# Patient Record
Sex: MALE | Race: WHITE | NOT HISPANIC OR LATINO
[De-identification: names, ages, dates, MRNs, and addresses within clinical notes are randomized per-mention and may not be internally consistent; named-entity substitution may affect disease eponyms.]

---

## 2020-11-25 PROBLEM — Z00.00 ENCOUNTER FOR PREVENTIVE HEALTH EXAMINATION: Status: ACTIVE | Noted: 2020-11-25

## 2020-12-22 ENCOUNTER — APPOINTMENT (OUTPATIENT)
Dept: CARDIOLOGY | Facility: CLINIC | Age: 60
End: 2020-12-22
Payer: COMMERCIAL

## 2020-12-22 ENCOUNTER — NON-APPOINTMENT (OUTPATIENT)
Age: 60
End: 2020-12-22

## 2020-12-22 VITALS — SYSTOLIC BLOOD PRESSURE: 160 MMHG | DIASTOLIC BLOOD PRESSURE: 90 MMHG

## 2020-12-22 VITALS — WEIGHT: 145 LBS | BODY MASS INDEX: 24.75 KG/M2 | HEIGHT: 64 IN | OXYGEN SATURATION: 98 % | HEART RATE: 75 BPM

## 2020-12-22 VITALS — SYSTOLIC BLOOD PRESSURE: 150 MMHG | DIASTOLIC BLOOD PRESSURE: 90 MMHG

## 2020-12-22 DIAGNOSIS — Z82.0 FAMILY HISTORY OF EPILEPSY AND OTHER DISEASES OF THE NERVOUS SYSTEM: ICD-10-CM

## 2020-12-22 DIAGNOSIS — Z80.0 FAMILY HISTORY OF MALIGNANT NEOPLASM OF DIGESTIVE ORGANS: ICD-10-CM

## 2020-12-22 DIAGNOSIS — Z82.49 FAMILY HISTORY OF ISCHEMIC HEART DISEASE AND OTHER DISEASES OF THE CIRCULATORY SYSTEM: ICD-10-CM

## 2020-12-22 DIAGNOSIS — Z82.5 FAMILY HISTORY OF ASTHMA AND OTHER CHRONIC LOWER RESPIRATORY DISEASES: ICD-10-CM

## 2020-12-22 DIAGNOSIS — Z78.9 OTHER SPECIFIED HEALTH STATUS: ICD-10-CM

## 2020-12-22 PROCEDURE — 93000 ELECTROCARDIOGRAM COMPLETE: CPT

## 2020-12-22 PROCEDURE — 99072 ADDL SUPL MATRL&STAF TM PHE: CPT

## 2020-12-22 PROCEDURE — 99204 OFFICE O/P NEW MOD 45 MIN: CPT

## 2020-12-22 RX ORDER — ALPRAZOLAM 0.5 MG/1
0.5 TABLET ORAL DAILY
Refills: 0 | Status: ACTIVE | COMMUNITY

## 2020-12-22 RX ORDER — PAROXETINE 25 MG/1
25 TABLET, FILM COATED, EXTENDED RELEASE ORAL
Qty: 90 | Refills: 0 | Status: ACTIVE | COMMUNITY
Start: 2020-11-17

## 2020-12-22 NOTE — PHYSICAL EXAM
[General Appearance - Well Developed] : well developed [Normal Appearance] : normal appearance [Well Groomed] : well groomed [General Appearance - Well Nourished] : well nourished [No Deformities] : no deformities [General Appearance - In No Acute Distress] : no acute distress [Normal Conjunctiva] : the conjunctiva exhibited no abnormalities [Eyelids - No Xanthelasma] : the eyelids demonstrated no xanthelasmas [Normal Oral Mucosa] : normal oral mucosa [No Oral Pallor] : no oral pallor [No Oral Cyanosis] : no oral cyanosis [Normal Jugular Venous A Waves Present] : normal jugular venous A waves present [Normal Jugular Venous V Waves Present] : normal jugular venous V waves present [No Jugular Venous Key A Waves] : no jugular venous key A waves [Heart Rate And Rhythm] : heart rate and rhythm were normal [Heart Sounds] : normal S1 and S2 [Murmurs] : no murmurs present [Respiration, Rhythm And Depth] : normal respiratory rhythm and effort [Exaggerated Use Of Accessory Muscles For Inspiration] : no accessory muscle use [Auscultation Breath Sounds / Voice Sounds] : lungs were clear to auscultation bilaterally [Abdomen Soft] : soft [Abdomen Tenderness] : non-tender [Abdomen Mass (___ Cm)] : no abdominal mass palpated [Abnormal Walk] : normal gait [Gait - Sufficient For Exercise Testing] : the gait was sufficient for exercise testing [Nail Clubbing] : no clubbing of the fingernails [Cyanosis, Localized] : no localized cyanosis [Petechial Hemorrhages (___cm)] : no petechial hemorrhages [Skin Color & Pigmentation] : normal skin color and pigmentation [] : no rash [No Venous Stasis] : no venous stasis [Skin Lesions] : no skin lesions [No Skin Ulcers] : no skin ulcer [No Xanthoma] : no  xanthoma was observed [Oriented To Time, Place, And Person] : oriented to person, place, and time [Affect] : the affect was normal [Mood] : the mood was normal [No Anxiety] : not feeling anxious

## 2020-12-26 NOTE — ASSESSMENT
[FreeTextEntry1] : His blood pressure is high during this visit.  Apparently, in the past his usual blood pressure used to be in the 120-130 range.  He needs to follow low-salt diet.\par \par I did not hear any murmur suggestive of mitral regurgitation during this visit even with Valsalva maneuver.\par \par Blood test in November showed normal liver profile.  Total cholesterol was 152 with triglycerides of 103, HDL 43 and LDL 88.  TSH was 1.87 .  Blood test done in August showed blood sugar was 86, BUN 14.8 and creatinine 1.2.  Estimated GFR was 65.  Potassium was 4.3.  Total cholesterol was 160 with triglycerides 137, HDL 40 and LDL 93.\par \par Echocardiogram done in March 2020 showed normal left ventricular size, wall thickness and systolic function with ejection fraction of 55 to 60%.  Grade 1 diastolic dysfunction was present.  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  There was trace mitral regurgitation and tricuspid regurgitation.

## 2020-12-26 NOTE — REASON FOR VISIT
[Initial Evaluation] : an initial evaluation of [Hypertension] : hypertension [Palpitations] : palpitations [FreeTextEntry1] : mitral regurgitation.

## 2020-12-26 NOTE — HISTORY OF PRESENT ILLNESS
[FreeTextEntry1] : 60-year-old gentleman came for cardiac evaluation.  He is regularly followed by Dr. Guru Armas.\par \par About 20 years ago, he had palpitation whemn he felt fluttering of the heart.  His primary care doctor had referred him to Dr. Soriano, the cardiologist in [place].  He had echocardiogram and stress test at that time and  was told to have mild mitral regurgitation.  He was reassured about his condition and no prescription or treatment was prescribed at that time.  He was told to stay away from caffeine.\par \par Since then, he has been getting intermittent palpitation, which occurs on and off.  It feels like fluttering lasting couple of minutes.  There are no associated symptoms except sometimes cough.  The pattern of palpitation has remained unchanged.  He may get it on and off on a daily basis for up to a month, then it goes away, and then comes back again several months later.  It is not worsened by physical exertion.  He could not identify any specific triggers for the palpitation.  It subsides spontaneously.\par \par He denies any exertional chest pain, palp rotation, shortness of breath or dizziness. \par \par  He was recently diagnosed to have hypertension and was put on olmesartan 20 mg daily.  He has been taking atorvastatin 10 mg at bedtime for many years.  He has also been on Synthroid for at least 20 years.  He had Hashimoto's thyroiditis followed by hypothyroidism.\par \par On the way to the office he had palpitation.  He does not have any now.  He tries to follow a low-salt diet.  However he does eat processed foods.

## 2020-12-26 NOTE — DISCUSSION/SUMMARY
[FreeTextEntry1] : I reviewed the echo findings with him and the blood test results.  For evaluation of palpitation, I recommended telemetry monitor.  I elected not to change any of his blood pressure medications at this time, but gave him a low-salt diet sheet.  I will follow-up in a month.  If his blood pressure remains elevated during the next visit then I will increase his medications.\par \par Because of the interatrial septal aneurysm, I told him to take aspirin 81 mg once a day.\par \par For further evaluation of his intermittent palpitation I recommended telemetry monitor.

## 2021-01-28 ENCOUNTER — APPOINTMENT (OUTPATIENT)
Dept: CARDIOLOGY | Facility: CLINIC | Age: 61
End: 2021-01-28
Payer: COMMERCIAL

## 2021-01-28 VITALS — BODY MASS INDEX: 24.37 KG/M2 | WEIGHT: 142 LBS | OXYGEN SATURATION: 91 % | HEART RATE: 80 BPM

## 2021-01-28 VITALS — SYSTOLIC BLOOD PRESSURE: 136 MMHG | DIASTOLIC BLOOD PRESSURE: 84 MMHG

## 2021-01-28 DIAGNOSIS — I34.0 NONRHEUMATIC MITRAL (VALVE) INSUFFICIENCY: ICD-10-CM

## 2021-01-28 DIAGNOSIS — Z86.39 PERSONAL HISTORY OF OTHER ENDOCRINE, NUTRITIONAL AND METABOLIC DISEASE: ICD-10-CM

## 2021-01-28 PROCEDURE — 99214 OFFICE O/P EST MOD 30 MIN: CPT

## 2021-01-28 PROCEDURE — 99072 ADDL SUPL MATRL&STAF TM PHE: CPT

## 2021-01-28 NOTE — REASON FOR VISIT
[Follow-Up - Clinic] : a clinic follow-up of [Hypertension] : hypertension [Palpitations] : palpitations [FreeTextEntry1] : DENNISC, APC.

## 2021-01-28 NOTE — HISTORY OF PRESENT ILLNESS
[FreeTextEntry1] : He denies any chest pain shortness of breath or dizziness.  He still gets some palpitations and skipped beats on and off.  A Telemetry monitor tracings showed occasional APCs including couplets and occasional VPC including short episodes of bigeminy.

## 2021-01-28 NOTE — ASSESSMENT
[FreeTextEntry1] : His blood pressure has gone down significantly with the treatment that was started during his previous visit.  It needs to go down even further.  He also has palpitation with APCs and VPCs.

## 2021-01-28 NOTE — PHYSICAL EXAM
[General Appearance - Well Developed] : well developed [Normal Appearance] : normal appearance [Well Groomed] : well groomed [General Appearance - Well Nourished] : well nourished [No Deformities] : no deformities [General Appearance - In No Acute Distress] : no acute distress [Normal Conjunctiva] : the conjunctiva exhibited no abnormalities [Eyelids - No Xanthelasma] : the eyelids demonstrated no xanthelasmas [Normal Oral Mucosa] : normal oral mucosa [No Oral Pallor] : no oral pallor [No Oral Cyanosis] : no oral cyanosis [Normal Jugular Venous A Waves Present] : normal jugular venous A waves present [Normal Jugular Venous V Waves Present] : normal jugular venous V waves present [No Jugular Venous Key A Waves] : no jugular venous key A waves [Respiration, Rhythm And Depth] : normal respiratory rhythm and effort [Exaggerated Use Of Accessory Muscles For Inspiration] : no accessory muscle use [Auscultation Breath Sounds / Voice Sounds] : lungs were clear to auscultation bilaterally [Heart Rate And Rhythm] : heart rate and rhythm were normal [Heart Sounds] : normal S1 and S2 [Murmurs] : no murmurs present [Abdomen Soft] : soft [Abdomen Tenderness] : non-tender [Abdomen Mass (___ Cm)] : no abdominal mass palpated [Abnormal Walk] : normal gait [Gait - Sufficient For Exercise Testing] : the gait was sufficient for exercise testing [Nail Clubbing] : no clubbing of the fingernails [Cyanosis, Localized] : no localized cyanosis [Petechial Hemorrhages (___cm)] : no petechial hemorrhages [Skin Color & Pigmentation] : normal skin color and pigmentation [] : no rash [No Venous Stasis] : no venous stasis [Skin Lesions] : no skin lesions [No Skin Ulcers] : no skin ulcer [No Xanthoma] : no  xanthoma was observed [Oriented To Time, Place, And Person] : oriented to person, place, and time [Affect] : the affect was normal [Mood] : the mood was normal [No Anxiety] : not feeling anxious

## 2021-01-28 NOTE — DISCUSSION/SUMMARY
[FreeTextEntry1] : To get blood pressure under better control and also for the palpitation in the atrioventricular arrhythmia I added metoprolol succinate 25 mg daily and hydrochlorothiazide 12.5 mg daily.  Basic metabolic panel to be done 2 weeks later.  He is on the results further recommendations will follow.

## 2021-02-13 ENCOUNTER — TRANSCRIPTION ENCOUNTER (OUTPATIENT)
Age: 61
End: 2021-02-13

## 2021-02-25 ENCOUNTER — APPOINTMENT (OUTPATIENT)
Dept: CARDIOLOGY | Facility: CLINIC | Age: 61
End: 2021-02-25
Payer: COMMERCIAL

## 2021-02-25 VITALS — BODY MASS INDEX: 24.55 KG/M2 | OXYGEN SATURATION: 96 % | HEART RATE: 54 BPM | WEIGHT: 143 LBS

## 2021-02-25 VITALS — DIASTOLIC BLOOD PRESSURE: 80 MMHG | SYSTOLIC BLOOD PRESSURE: 134 MMHG

## 2021-02-25 LAB
ANION GAP SERPL CALC-SCNC: 11 MMOL/L
BUN SERPL-MCNC: 17 MG/DL
CALCIUM SERPL-MCNC: 9.5 MG/DL
CHLORIDE SERPL-SCNC: 102 MMOL/L
CO2 SERPL-SCNC: 29 MMOL/L
CREAT SERPL-MCNC: 1.18 MG/DL
GLUCOSE SERPL-MCNC: 81 MG/DL
MAGNESIUM SERPL-MCNC: 2.1 MG/DL
POTASSIUM SERPL-SCNC: 4.3 MMOL/L
SODIUM SERPL-SCNC: 142 MMOL/L

## 2021-02-25 PROCEDURE — 99072 ADDL SUPL MATRL&STAF TM PHE: CPT

## 2021-02-25 PROCEDURE — 99214 OFFICE O/P EST MOD 30 MIN: CPT

## 2021-02-25 NOTE — DISCUSSION/SUMMARY
[FreeTextEntry1] : At this time I elected not to change any of his medications at size low salt diet.  If his blood pressure continues to stay in this range during his next visit, I did increase hydrochlorothiazide or add low dose amlodipine depending upon how he is doing.

## 2021-02-25 NOTE — HISTORY OF PRESENT ILLNESS
[FreeTextEntry1] : He states that with the addition of metoprolol 25 mg daily the palpitation has completely resolved.  Hydrochlorothiazide 12.5 mg once a day was also added during his previous visit for better control of blood pressure.Presently he has no symptoms.\par \par Basic metabolic panel done on the 21st was normal.

## 2021-02-25 NOTE — ASSESSMENT
[FreeTextEntry1] : His palpitation is controlled on low-dose metoprolol.  Blood pressure is basically the same in spite of addition of low-dose hydrochlorothiazide.

## 2021-04-15 ENCOUNTER — NON-APPOINTMENT (OUTPATIENT)
Age: 61
End: 2021-04-15

## 2021-04-15 ENCOUNTER — APPOINTMENT (OUTPATIENT)
Dept: CARDIOLOGY | Facility: CLINIC | Age: 61
End: 2021-04-15
Payer: COMMERCIAL

## 2021-04-15 VITALS — BODY MASS INDEX: 24.37 KG/M2 | WEIGHT: 142 LBS | OXYGEN SATURATION: 94 % | HEART RATE: 64 BPM

## 2021-04-15 PROCEDURE — 93000 ELECTROCARDIOGRAM COMPLETE: CPT

## 2021-04-15 PROCEDURE — 99213 OFFICE O/P EST LOW 20 MIN: CPT

## 2021-04-15 PROCEDURE — 99072 ADDL SUPL MATRL&STAF TM PHE: CPT

## 2021-04-15 NOTE — HISTORY OF PRESENT ILLNESS
[FreeTextEntry1] : In general he feels okay.  He takes metoprolol with dinner around 6 -7 PM. An hour or so after that he feels mild palpitation lasting couple of minutes.  Rarely feels it during the day.  Associated symptoms.

## 2021-04-15 NOTE — ASSESSMENT
[FreeTextEntry1] : Overall he is doing well.  He still gets occasional palpitation.  I wonder if it is due to trough level of the medication.

## 2021-04-15 NOTE — DISCUSSION/SUMMARY
[FreeTextEntry1] : I did not make any change in his medications but on a trial basis I told him to take metoprolol in the morning and see how he does.  He was taking Lipitor in the morning I told him to take it at bedtime.  He is adequatrely beta-blocked on 25 mg dose.

## 2021-06-16 ENCOUNTER — RX RENEWAL (OUTPATIENT)
Age: 61
End: 2021-06-16

## 2021-07-22 ENCOUNTER — RX RENEWAL (OUTPATIENT)
Age: 61
End: 2021-07-22

## 2021-08-12 ENCOUNTER — APPOINTMENT (OUTPATIENT)
Dept: CARDIOLOGY | Facility: CLINIC | Age: 61
End: 2021-08-12
Payer: COMMERCIAL

## 2021-08-12 ENCOUNTER — NON-APPOINTMENT (OUTPATIENT)
Age: 61
End: 2021-08-12

## 2021-08-12 VITALS — SYSTOLIC BLOOD PRESSURE: 114 MMHG | DIASTOLIC BLOOD PRESSURE: 70 MMHG

## 2021-08-12 VITALS — OXYGEN SATURATION: 97 % | BODY MASS INDEX: 24.03 KG/M2 | HEART RATE: 57 BPM | WEIGHT: 140 LBS

## 2021-08-12 VITALS — DIASTOLIC BLOOD PRESSURE: 70 MMHG | SYSTOLIC BLOOD PRESSURE: 114 MMHG

## 2021-08-12 PROCEDURE — 99214 OFFICE O/P EST MOD 30 MIN: CPT

## 2021-08-12 PROCEDURE — 93000 ELECTROCARDIOGRAM COMPLETE: CPT

## 2021-08-12 NOTE — HISTORY OF PRESENT ILLNESS
[FreeTextEntry1] : To get intermittent palpitation. Like skipped beat. It lasts a few seconds. There are no associated symptoms. He also gets postural dizziness. He is tired during the day and can go to sleep anytime. However, he does not have any history consistent with sleep apnea.

## 2021-08-12 NOTE — ASSESSMENT
[FreeTextEntry1] : Has postural dizziness. Blood pressure is very well controlled.\par \par Medications can be decreased to see if he would feel better.

## 2021-08-12 NOTE — REASON FOR VISIT
[Symptom and Test Evaluation] : symptom and test evaluation [Arrhythmia/ECG Abnorrmalities] : arrhythmia/ECG abnormalities [Hypertension] : hypertension

## 2021-08-12 NOTE — DISCUSSION/SUMMARY
[FreeTextEntry1] : In a trial basis I told him to hold hydrochlorothiazide and see what happens with his postural dizziness. He has no exertional related symptoms. He rides his bike without any symptoms including palpitation.

## 2021-09-16 ENCOUNTER — APPOINTMENT (OUTPATIENT)
Dept: CARDIOLOGY | Facility: CLINIC | Age: 61
End: 2021-09-16
Payer: COMMERCIAL

## 2021-09-16 VITALS — OXYGEN SATURATION: 98 % | BODY MASS INDEX: 24.2 KG/M2 | HEART RATE: 49 BPM | WEIGHT: 141 LBS

## 2021-09-16 VITALS — DIASTOLIC BLOOD PRESSURE: 80 MMHG | SYSTOLIC BLOOD PRESSURE: 144 MMHG

## 2021-09-16 PROCEDURE — 99214 OFFICE O/P EST MOD 30 MIN: CPT

## 2021-09-16 RX ORDER — OLMESARTAN MEDOXOMIL 20 MG/1
20 TABLET, FILM COATED ORAL
Qty: 90 | Refills: 1 | Status: ACTIVE | COMMUNITY
Start: 2020-05-21 | End: 1900-01-01

## 2021-09-16 NOTE — HISTORY OF PRESENT ILLNESS
[FreeTextEntry1] : He feels better without the hydrochlorothiazide.  Does not have postural dizziness.  Palpitation is like fluttering and very infrequent and does not bother him.

## 2021-09-16 NOTE — DISCUSSION/SUMMARY
[FreeTextEntry1] : I told him to stay on the diuretic.  However for better control of blood pressure I increased olmesartan to 40 mg daily.  Based upon his response further adjustments will follow.

## 2021-09-16 NOTE — ASSESSMENT
[FreeTextEntry1] : Although the postural dizziness is gone without hydrochlorothiazide, systolic blood pressure has gone up.  Palpitation is not a major issue at this time.

## 2021-10-14 ENCOUNTER — APPOINTMENT (OUTPATIENT)
Dept: CARDIOLOGY | Facility: CLINIC | Age: 61
End: 2021-10-14
Payer: COMMERCIAL

## 2021-10-14 VITALS — SYSTOLIC BLOOD PRESSURE: 144 MMHG | DIASTOLIC BLOOD PRESSURE: 86 MMHG

## 2021-10-14 VITALS
OXYGEN SATURATION: 97 % | SYSTOLIC BLOOD PRESSURE: 150 MMHG | DIASTOLIC BLOOD PRESSURE: 80 MMHG | HEART RATE: 53 BPM | TEMPERATURE: 97.2 F

## 2021-10-14 VITALS — DIASTOLIC BLOOD PRESSURE: 80 MMHG | SYSTOLIC BLOOD PRESSURE: 164 MMHG

## 2021-10-14 PROCEDURE — 99214 OFFICE O/P EST MOD 30 MIN: CPT

## 2021-10-14 RX ORDER — MV-MIN/FOLIC/VIT K/LYCOP/COQ10 200-100MCG
CAPSULE ORAL DAILY
Refills: 0 | Status: ACTIVE | COMMUNITY

## 2021-10-14 NOTE — DISCUSSION/SUMMARY
[FreeTextEntry1] : I told him to go back on hydrochlorothiazide 12.5 mg daily.  Based upon his response I will make further adjustments.

## 2021-10-14 NOTE — HISTORY OF PRESENT ILLNESS
[FreeTextEntry1] : He feels good after he stopped taking hydrochlorothiazide.  There is no more dizziness.  However his blood pressure has gone up.  Palpitation is a little better with the higher dose of med.

## 2021-11-10 ENCOUNTER — RESULT CHARGE (OUTPATIENT)
Age: 61
End: 2021-11-10

## 2021-11-11 ENCOUNTER — APPOINTMENT (OUTPATIENT)
Dept: CARDIOLOGY | Facility: CLINIC | Age: 61
End: 2021-11-11
Payer: COMMERCIAL

## 2021-11-11 VITALS — SYSTOLIC BLOOD PRESSURE: 116 MMHG | DIASTOLIC BLOOD PRESSURE: 60 MMHG

## 2021-11-11 VITALS — OXYGEN SATURATION: 97 % | HEART RATE: 56 BPM | TEMPERATURE: 98.42 F

## 2021-11-11 PROCEDURE — 93000 ELECTROCARDIOGRAM COMPLETE: CPT

## 2021-11-11 PROCEDURE — 99214 OFFICE O/P EST MOD 30 MIN: CPT

## 2021-11-11 NOTE — ASSESSMENT
[FreeTextEntry1] : He is able to tolerate hydrochlorothiazide now.  There is no dizziness.  Blood pressure is very well controlled as well as the heart rate.  He has no palpitations.

## 2021-11-11 NOTE — HISTORY OF PRESENT ILLNESS
[FreeTextEntry1] : Denies any cardiac complaints.  Manually I got blood pressure 116/60.  This morning he got the same blood pressure at home and wondered if it was really true.  Blood pressure taken now with his digital manometer was 117/67.  There seems to be pretty good correlation between manual blood pressure and 1 gotten from his digital manometer.\par \par Denies any dizziness or palpitation.

## 2021-11-11 NOTE — DISCUSSION/SUMMARY
[FreeTextEntry1] : He is doing well from cardiac point of view.  Because of atrial septal aneurysm I will continue with recommendation regarding aspirin use.  I did not make any changes.

## 2021-12-10 ENCOUNTER — RX RENEWAL (OUTPATIENT)
Age: 61
End: 2021-12-10

## 2022-02-18 ENCOUNTER — APPOINTMENT (OUTPATIENT)
Dept: CARDIOLOGY | Facility: CLINIC | Age: 62
End: 2022-02-18
Payer: COMMERCIAL

## 2022-02-18 ENCOUNTER — NON-APPOINTMENT (OUTPATIENT)
Age: 62
End: 2022-02-18

## 2022-02-18 VITALS
OXYGEN SATURATION: 97 % | WEIGHT: 137 LBS | HEIGHT: 64 IN | HEART RATE: 50 BPM | TEMPERATURE: 207.68 F | BODY MASS INDEX: 23.39 KG/M2

## 2022-02-18 VITALS — SYSTOLIC BLOOD PRESSURE: 110 MMHG | DIASTOLIC BLOOD PRESSURE: 60 MMHG

## 2022-02-18 PROCEDURE — 99214 OFFICE O/P EST MOD 30 MIN: CPT

## 2022-02-18 PROCEDURE — 93000 ELECTROCARDIOGRAM COMPLETE: CPT

## 2022-02-18 NOTE — HISTORY OF PRESENT ILLNESS
[FreeTextEntry1] : He is overall stable.  In January he had a little bit of palpitation because by mistake he was taking 25 mg of metoprolol instead of 50 mg.  Once he realized this and RN corrected it palpitation and become very infrequent.  He gets occasional skipping without any associated symptoms.

## 2022-02-18 NOTE — DISCUSSION/SUMMARY
[FreeTextEntry1] : As he is doing well and since his palpitations are not very significant.  I elected not to make any changes.  I explained to him the presence of sinus bradycardia and first-degree AV block.  If you were to get undue fatigue or dizziness or tiredness he was to hold metoprolol and call me.

## 2022-02-18 NOTE — ASSESSMENT
[FreeTextEntry1] : Hemodynamically stable.  Blood pressure and heart rate are well controlled.  Palpitation is infrequent and not with any associated hemodynamic symptoms or compromise.

## 2022-05-03 ENCOUNTER — APPOINTMENT (OUTPATIENT)
Dept: CARDIOLOGY | Facility: CLINIC | Age: 62
End: 2022-05-03
Payer: COMMERCIAL

## 2022-05-03 VITALS
WEIGHT: 142 LBS | BODY MASS INDEX: 24.24 KG/M2 | HEIGHT: 64 IN | TEMPERATURE: 206.6 F | OXYGEN SATURATION: 96 % | HEART RATE: 55 BPM

## 2022-05-03 VITALS — DIASTOLIC BLOOD PRESSURE: 70 MMHG | SYSTOLIC BLOOD PRESSURE: 116 MMHG

## 2022-05-03 DIAGNOSIS — E03.9 HYPOTHYROIDISM, UNSPECIFIED: ICD-10-CM

## 2022-05-03 DIAGNOSIS — I49.1 ATRIAL PREMATURE DEPOLARIZATION: ICD-10-CM

## 2022-05-03 PROCEDURE — 99214 OFFICE O/P EST MOD 30 MIN: CPT

## 2022-05-03 NOTE — ASSESSMENT
[FreeTextEntry1] : His blood pressure is well controlled.  He has no symptoms of ACS or fluid overload.  Palpitation is infrequent and not associated with any hemodynamic problems.

## 2022-05-03 NOTE — HISTORY OF PRESENT ILLNESS
[FreeTextEntry1] : 58-year-old gentleman came for follow-up of hypertension, hyperlipidemia and palpitations.\par \par He exercises regularly.  He denies any exertional chest pain shortness of breath or dizziness.  He gets palpitation very rarely and it is very minor and not associated with any other symptoms.

## 2022-06-21 ENCOUNTER — NON-APPOINTMENT (OUTPATIENT)
Age: 62
End: 2022-06-21

## 2022-06-21 ENCOUNTER — APPOINTMENT (OUTPATIENT)
Dept: CARDIOLOGY | Facility: CLINIC | Age: 62
End: 2022-06-21
Payer: COMMERCIAL

## 2022-06-21 VITALS
OXYGEN SATURATION: 96 % | HEIGHT: 64 IN | WEIGHT: 133 LBS | BODY MASS INDEX: 22.71 KG/M2 | SYSTOLIC BLOOD PRESSURE: 110 MMHG | TEMPERATURE: 207.68 F | DIASTOLIC BLOOD PRESSURE: 70 MMHG | HEART RATE: 60 BPM

## 2022-06-21 DIAGNOSIS — R00.2 PALPITATIONS: ICD-10-CM

## 2022-06-21 DIAGNOSIS — I49.3 VENTRICULAR PREMATURE DEPOLARIZATION: ICD-10-CM

## 2022-06-21 PROCEDURE — 99215 OFFICE O/P EST HI 40 MIN: CPT

## 2022-06-21 PROCEDURE — 93000 ELECTROCARDIOGRAM COMPLETE: CPT

## 2022-06-21 RX ORDER — PANTOPRAZOLE 40 MG/1
40 TABLET, DELAYED RELEASE ORAL
Qty: 90 | Refills: 0 | Status: ACTIVE | COMMUNITY
Start: 2022-05-27

## 2022-06-21 NOTE — DISCUSSION/SUMMARY
[EKG obtained to assist in diagnosis and management of assessed problem(s)] : EKG obtained to assist in diagnosis and management of assessed problem(s) [FreeTextEntry1] : 62 yo male with increasing palpitations.\par likely stress/anxiety related, does not seem to be organic cause; will get most recent labs form PCP and repeat echo to assess LVEF and LA size. If palpitations become more problematic, suggested repeating long term monitor and/or ILR implantation.\par BP's adequately controlled, continue current Rx.\par Suggested increased physical activity to assure better sleep hygiene; may benefit from sleep study as well.

## 2022-06-21 NOTE — CARDIOLOGY SUMMARY
[de-identified] : 6/21/2022 [de-identified] : 1/23/2021, sinus rhythm with no significant dysrhythmia.  (26-day monitor) [de-identified] : 3/24/2020, ejection fraction 55 to 60%, impaired relaxation, interatrial septal aneurysm, trace MR TR.

## 2022-06-21 NOTE — HISTORY OF PRESENT ILLNESS
[FreeTextEntry1] : 61-year-old male followed by Dr. Flores for history of hypertension, hyperlipidemia and increasingly frequent palpitations, mostly at night when ltying in bed. He is being followed by Dr. Baer for anxiety/stress; uses Xanax 5-7 times weekly.\par No prior history of any coronary artery disease.\par He exercises infrequently.  He denies any exertional chest pain shortness of breath or dizziness.

## 2022-07-14 ENCOUNTER — APPOINTMENT (OUTPATIENT)
Dept: CARDIOLOGY | Facility: CLINIC | Age: 62
End: 2022-07-14

## 2022-07-14 PROCEDURE — 93306 TTE W/DOPPLER COMPLETE: CPT

## 2022-09-20 ENCOUNTER — APPOINTMENT (OUTPATIENT)
Dept: CARDIOLOGY | Facility: CLINIC | Age: 62
End: 2022-09-20

## 2022-09-20 VITALS
BODY MASS INDEX: 24.59 KG/M2 | OXYGEN SATURATION: 97 % | WEIGHT: 144 LBS | TEMPERATURE: 208.22 F | HEART RATE: 55 BPM | SYSTOLIC BLOOD PRESSURE: 110 MMHG | HEIGHT: 64 IN | DIASTOLIC BLOOD PRESSURE: 60 MMHG

## 2022-09-20 DIAGNOSIS — I25.3 ANEURYSM OF HEART: ICD-10-CM

## 2022-09-20 PROCEDURE — 99213 OFFICE O/P EST LOW 20 MIN: CPT | Mod: 25

## 2022-09-20 PROCEDURE — 93000 ELECTROCARDIOGRAM COMPLETE: CPT

## 2022-09-20 NOTE — CARDIOLOGY SUMMARY
[de-identified] : 9/20/22,  [de-identified] : 1/23/2021, sinus rhythm with no significant dysrhythmia.  (26-day monitor) [de-identified] : 3/24/2020, ejection fraction 55 to 60%, impaired relaxation, interatrial septal aneurysm, trace MR TR.

## 2022-09-20 NOTE — HISTORY OF PRESENT ILLNESS
[FreeTextEntry1] : 62-year-old male previously followed by Dr. Folres for history of hypertension, hyperlipidemia and increasingly frequent palpitations, mostly at night when lying in bed. He is being followed by Dr. Baer for anxiety/stress; uses Xanax 5-7 times weekly.\par No prior history of any coronary artery disease.\par He exercises infrequently.  He denies any exertional chest pain shortness of breath or dizziness. \par Weight fluctuates.\par No meds changes.

## 2022-09-20 NOTE — DISCUSSION/SUMMARY
[FreeTextEntry1] : 61 yo male with intermittent palpitations, likely stress/anxiety related, does not seem to be organic cause; reviewed most recent labs form PCP. If palpitations become more problematic, suggested repeating long term monitor and/or ILR implantation.\par BP's adequately controlled, continue current Rx.\par Suggested increased physical activity to assure better sleep hygiene; avoid bright lights at night. Consider referral to sleep specialist. [EKG obtained to assist in diagnosis and management of assessed problem(s)] : EKG obtained to assist in diagnosis and management of assessed problem(s)

## 2023-03-20 ENCOUNTER — NON-APPOINTMENT (OUTPATIENT)
Age: 63
End: 2023-03-20

## 2023-03-20 ENCOUNTER — APPOINTMENT (OUTPATIENT)
Dept: CARDIOLOGY | Facility: CLINIC | Age: 63
End: 2023-03-20
Payer: COMMERCIAL

## 2023-03-20 VITALS
WEIGHT: 146 LBS | BODY MASS INDEX: 24.92 KG/M2 | DIASTOLIC BLOOD PRESSURE: 60 MMHG | TEMPERATURE: 207.68 F | HEIGHT: 64 IN | OXYGEN SATURATION: 79 % | HEART RATE: 74 BPM | SYSTOLIC BLOOD PRESSURE: 110 MMHG

## 2023-03-20 PROCEDURE — 93000 ELECTROCARDIOGRAM COMPLETE: CPT

## 2023-03-20 PROCEDURE — 99214 OFFICE O/P EST MOD 30 MIN: CPT | Mod: 25

## 2023-03-20 NOTE — DISCUSSION/SUMMARY
[EKG obtained to assist in diagnosis and management of assessed problem(s)] : EKG obtained to assist in diagnosis and management of assessed problem(s) [FreeTextEntry1] : 63 yo male with intermittent palpitations, likely stress/anxiety related, does not seem to be organic cause; reviewed most recent labs form PCP.  \par \par Prior event monitors do not show any significant SVTs only isolated VPCs.  I am concerned about evidence of worsening conduction system disease including asymptomatic bradycardia arrhythmias and first-degree AV block.  Suggested decreasing his metoprolol from 50 to 25 mg daily, windowsills 1 in the past patient complained of increased palpitations.  If the palpitations should increase, would repeat long-term event monitoring and consider ILR implantation.  Likely will eventually need pacemaker for his conduction system disease.\par \par BP's adequately controlled, continue current Rx.\par Suggested increased physical activity to assure better sleep hygiene; consider referral to sleep specialist.

## 2023-03-20 NOTE — CARDIOLOGY SUMMARY
[de-identified] : 3/20/23,Sinus  Bradycardia  -First degree A-V block , -RSR(V1) -nondiagnostic. \par  [de-identified] : 1/23/2021, sinus rhythm with no significant dysrhythmia.  (26-day monitor) [de-identified] : 7/14/22, EF 71%,  MR and TR\par 3/24/2020, ejection fraction 55 to 60%, impaired relaxation, interatrial septal aneurysm, trace MR TR.

## 2023-03-20 NOTE — HISTORY OF PRESENT ILLNESS
[FreeTextEntry1] : 62-year-old male previously followed by Dr. Flores for history of hypertension, hyperlipidemia and increasingly frequent palpitations, mostly at night when lying in bed. He is being followed by Dr. Baer for anxiety/stress; uses Xanax 5-7 times weekly.\par No prior history of any coronary artery disease.\par He exercises infrequently.  He denies any exertional chest pain shortness of breath or dizziness. \par Weight up\par No meds changes.

## 2023-09-20 ENCOUNTER — APPOINTMENT (OUTPATIENT)
Dept: CARDIOLOGY | Facility: CLINIC | Age: 63
End: 2023-09-20
Payer: MEDICARE

## 2023-09-20 ENCOUNTER — NON-APPOINTMENT (OUTPATIENT)
Age: 63
End: 2023-09-20

## 2023-09-20 VITALS
DIASTOLIC BLOOD PRESSURE: 60 MMHG | SYSTOLIC BLOOD PRESSURE: 126 MMHG | BODY MASS INDEX: 23.9 KG/M2 | WEIGHT: 140 LBS | OXYGEN SATURATION: 98 % | HEIGHT: 64 IN | HEART RATE: 65 BPM

## 2023-09-20 DIAGNOSIS — E78.5 HYPERLIPIDEMIA, UNSPECIFIED: ICD-10-CM

## 2023-09-20 DIAGNOSIS — I44.0 ATRIOVENTRICULAR BLOCK, FIRST DEGREE: ICD-10-CM

## 2023-09-20 PROCEDURE — 93000 ELECTROCARDIOGRAM COMPLETE: CPT

## 2023-09-20 PROCEDURE — 99213 OFFICE O/P EST LOW 20 MIN: CPT

## 2023-12-12 NOTE — PHYSICAL EXAM
[General Appearance - Well Developed] : well developed [Normal Appearance] : normal appearance [Well Groomed] : well groomed [General Appearance - Well Nourished] : well nourished [No Deformities] : no deformities [General Appearance - In No Acute Distress] : no acute distress [Normal Conjunctiva] : the conjunctiva exhibited no abnormalities [Eyelids - No Xanthelasma] : the eyelids demonstrated no xanthelasmas [Normal Oral Mucosa] : normal oral mucosa [No Oral Pallor] : no oral pallor [No Oral Cyanosis] : no oral cyanosis [Normal Jugular Venous A Waves Present] : normal jugular venous A waves present [Normal Jugular Venous V Waves Present] : normal jugular venous V waves present [No Jugular Venous Key A Waves] : no jugular venous key A waves [Respiration, Rhythm And Depth] : normal respiratory rhythm and effort [Exaggerated Use Of Accessory Muscles For Inspiration] : no accessory muscle use [Auscultation Breath Sounds / Voice Sounds] : lungs were clear to auscultation bilaterally [Heart Rate And Rhythm] : heart rate and rhythm were normal [Heart Sounds] : normal S1 and S2 [Murmurs] : no murmurs present [Abdomen Soft] : soft [Abdomen Tenderness] : non-tender [Abdomen Mass (___ Cm)] : no abdominal mass palpated [Abnormal Walk] : normal gait [Gait - Sufficient For Exercise Testing] : the gait was sufficient for exercise testing [Nail Clubbing] : no clubbing of the fingernails [Cyanosis, Localized] : no localized cyanosis [Petechial Hemorrhages (___cm)] : no petechial hemorrhages [Skin Color & Pigmentation] : normal skin color and pigmentation [] : no rash [No Venous Stasis] : no venous stasis [Skin Lesions] : no skin lesions [No Skin Ulcers] : no skin ulcer [No Xanthoma] : no  xanthoma was observed [Oriented To Time, Place, And Person] : oriented to person, place, and time [Affect] : the affect was normal [Mood] : the mood was normal [No Anxiety] : not feeling anxious poor balance

## 2024-03-20 ENCOUNTER — APPOINTMENT (OUTPATIENT)
Dept: CARDIOLOGY | Facility: CLINIC | Age: 64
End: 2024-03-20
Payer: MEDICARE

## 2024-03-20 ENCOUNTER — NON-APPOINTMENT (OUTPATIENT)
Age: 64
End: 2024-03-20

## 2024-03-20 VITALS
WEIGHT: 140 LBS | HEIGHT: 64 IN | HEART RATE: 48 BPM | DIASTOLIC BLOOD PRESSURE: 70 MMHG | BODY MASS INDEX: 23.9 KG/M2 | SYSTOLIC BLOOD PRESSURE: 124 MMHG | OXYGEN SATURATION: 98 %

## 2024-03-20 DIAGNOSIS — R00.1 BRADYCARDIA, UNSPECIFIED: ICD-10-CM

## 2024-03-20 DIAGNOSIS — R00.2 PALPITATIONS: ICD-10-CM

## 2024-03-20 DIAGNOSIS — F41.9 ANXIETY DISORDER, UNSPECIFIED: ICD-10-CM

## 2024-03-20 PROCEDURE — 93000 ELECTROCARDIOGRAM COMPLETE: CPT

## 2024-03-20 PROCEDURE — G2211 COMPLEX E/M VISIT ADD ON: CPT

## 2024-03-20 PROCEDURE — 99214 OFFICE O/P EST MOD 30 MIN: CPT

## 2024-03-20 RX ORDER — HYDROCHLOROTHIAZIDE 12.5 MG/1
12.5 TABLET ORAL
Qty: 90 | Refills: 3 | Status: DISCONTINUED | COMMUNITY
Start: 2021-01-28 | End: 2024-03-20

## 2024-03-20 NOTE — CARDIOLOGY SUMMARY
[de-identified] : 3/20/24, Marked sinus Bradycardia (49 bpm), -RSR(V1) -nondiagnostic. 9/20/23, Sinus Bradycardia -First degree A-V block , -RSR(V1) -nondiagnostic. 3/20/23,Sinus  Bradycardia  -First degree A-V block , -RSR(V1) -nondiagnostic.   [de-identified] : 1/23/2021, sinus rhythm with no significant dysrhythmia.  (26-day monitor) [de-identified] : 7/14/22, EF 71%,  MR and TR\par  3/24/2020, ejection fraction 55 to 60%, impaired relaxation, interatrial septal aneurysm, trace MR TR.

## 2024-03-20 NOTE — DISCUSSION/SUMMARY
[EKG obtained to assist in diagnosis and management of assessed problem(s)] : EKG obtained to assist in diagnosis and management of assessed problem(s) [FreeTextEntry1] : 63 yo male with intermittent palpitations (improved), likely stress/anxiety related.  Prior event monitors do not show any significant SVTs only isolated VPCs.   Stable conduction system disease including asymptomatic bradycardia arrhythmias and first-degree AV block.  If the palpitations should increase, would repeat long-term event monitoring and consider ILR implantation.   BP's adequately controlled, off HCTZ, can continue current Rx. Discussed elevated LDL, prefers to try further dieting before titration of his statin, f/u labs with PCP.

## 2024-03-20 NOTE — HISTORY OF PRESENT ILLNESS
[FreeTextEntry1] : 63-year-old male previously followed by Dr. Flores for history of hypertension, hyperlipidemia and increasingly frequent palpitations, mostly at night when lying in bed. He is being followed by Dr. Baer for anxiety/stress; uses Xanax 5-7 times weekly. No prior history of any coronary artery disease. He exercises infrequently.  He denies any exertional chest pain shortness of breath or dizziness.  c/o vocal tics infrequently and some positional lightheadedness - told by PCP to stop HCTZ.  Reviewed labs from this month, [id number].

## 2024-04-09 ENCOUNTER — APPOINTMENT (OUTPATIENT)
Dept: CARDIOLOGY | Facility: CLINIC | Age: 64
End: 2024-04-09
Payer: MEDICARE

## 2024-04-09 ENCOUNTER — NON-APPOINTMENT (OUTPATIENT)
Age: 64
End: 2024-04-09

## 2024-04-09 VITALS
BODY MASS INDEX: 23.05 KG/M2 | DIASTOLIC BLOOD PRESSURE: 72 MMHG | HEART RATE: 67 BPM | WEIGHT: 135 LBS | HEIGHT: 64 IN | OXYGEN SATURATION: 98 % | SYSTOLIC BLOOD PRESSURE: 118 MMHG

## 2024-04-09 DIAGNOSIS — R07.89 OTHER CHEST PAIN: ICD-10-CM

## 2024-04-09 DIAGNOSIS — I10 ESSENTIAL (PRIMARY) HYPERTENSION: ICD-10-CM

## 2024-04-09 PROCEDURE — 93000 ELECTROCARDIOGRAM COMPLETE: CPT

## 2024-04-09 PROCEDURE — 99214 OFFICE O/P EST MOD 30 MIN: CPT

## 2024-04-09 PROCEDURE — G2211 COMPLEX E/M VISIT ADD ON: CPT

## 2024-04-09 NOTE — HISTORY OF PRESENT ILLNESS
[FreeTextEntry1] : 63-year-old male previously followed by Dr. Flores for history of hypertension, hyperlipidemia and increasingly frequent palpitations, mostly at night when lying in bed. He is being followed by Dr. Baer for anxiety/stress; uses Xanax 5-7 times weekly. No prior history of any coronary artery disease. He exercises infrequently.  He denies any exertional chest pain shortness of breath or dizziness.  c/o vocal tics infrequently and some positional lightheadedness - told by PCP to stop HCTZ.  Reviewed labs from this month, [id number].  04/09/2024: Presents today with new onset exertional chest pain intermittent in nature no associated SOB, Lightheadedness, Dizziness.

## 2024-04-09 NOTE — CARDIOLOGY SUMMARY
[de-identified] : 04/09/2024:Sinus Bradycardia  -RSR(V1) -nondiagnostic.  PROBABLY NORMAL 3/20/24, Marked sinus Bradycardia (49 bpm), -RSR(V1) -nondiagnostic. 9/20/23, Sinus Bradycardia -First degree A-V block , -RSR(V1) -nondiagnostic. 3/20/23,Sinus  Bradycardia  -First degree A-V block , -RSR(V1) -nondiagnostic.   [de-identified] : 1/23/2021, sinus rhythm with no significant dysrhythmia.  (26-day monitor) [de-identified] : 7/14/22, EF 71%,  MR and TR\par  3/24/2020, ejection fraction 55 to 60%, impaired relaxation, interatrial septal aneurysm, trace MR TR.

## 2024-04-10 ENCOUNTER — APPOINTMENT (OUTPATIENT)
Dept: CARDIOLOGY | Facility: CLINIC | Age: 64
End: 2024-04-10
Payer: MEDICARE

## 2024-04-10 PROCEDURE — 93015 CV STRESS TEST SUPVJ I&R: CPT

## 2024-04-10 PROCEDURE — A9500: CPT

## 2024-04-10 PROCEDURE — 78452 HT MUSCLE IMAGE SPECT MULT: CPT

## 2024-04-30 ENCOUNTER — TRANSCRIPTION ENCOUNTER (OUTPATIENT)
Age: 64
End: 2024-04-30

## 2024-05-10 ENCOUNTER — TRANSCRIPTION ENCOUNTER (OUTPATIENT)
Age: 64
End: 2024-05-10

## 2024-05-10 RX ORDER — METOPROLOL SUCCINATE 50 MG/1
50 TABLET, EXTENDED RELEASE ORAL DAILY
Qty: 45 | Refills: 3 | Status: ACTIVE | COMMUNITY
Start: 2021-01-27 | End: 1900-01-01

## 2024-05-14 ENCOUNTER — TRANSCRIPTION ENCOUNTER (OUTPATIENT)
Age: 64
End: 2024-05-14

## 2024-05-20 ENCOUNTER — INPATIENT (INPATIENT)
Facility: HOSPITAL | Age: 64
LOS: 1 days | Discharge: HOME | DRG: 321 | End: 2024-05-22
Attending: INTERNAL MEDICINE | Admitting: INTERNAL MEDICINE
Payer: MEDICARE

## 2024-05-20 ENCOUNTER — TRANSCRIPTION ENCOUNTER (OUTPATIENT)
Age: 64
End: 2024-05-20

## 2024-05-20 VITALS — HEIGHT: 64 IN | WEIGHT: 128.97 LBS

## 2024-05-20 DIAGNOSIS — Z98.890 OTHER SPECIFIED POSTPROCEDURAL STATES: Chronic | ICD-10-CM

## 2024-05-20 DIAGNOSIS — R07.89 OTHER CHEST PAIN: ICD-10-CM

## 2024-05-20 LAB
ANION GAP SERPL CALC-SCNC: 12 MMOL/L — SIGNIFICANT CHANGE UP (ref 5–17)
BUN SERPL-MCNC: 29 MG/DL — HIGH (ref 7–23)
CALCIUM SERPL-MCNC: 10.2 MG/DL — SIGNIFICANT CHANGE UP (ref 8.4–10.5)
CHLORIDE SERPL-SCNC: 101 MMOL/L — SIGNIFICANT CHANGE UP (ref 96–108)
CO2 SERPL-SCNC: 24 MMOL/L — SIGNIFICANT CHANGE UP (ref 22–31)
CREAT SERPL-MCNC: 1.28 MG/DL — SIGNIFICANT CHANGE UP (ref 0.5–1.3)
EGFR: 62 ML/MIN/1.73M2 — SIGNIFICANT CHANGE UP
GLUCOSE SERPL-MCNC: 94 MG/DL — SIGNIFICANT CHANGE UP (ref 70–99)
HCT VFR BLD CALC: 44.4 % — SIGNIFICANT CHANGE UP (ref 39–50)
HGB BLD-MCNC: 14.5 G/DL — SIGNIFICANT CHANGE UP (ref 13–17)
MCHC RBC-ENTMCNC: 29.6 PG — SIGNIFICANT CHANGE UP (ref 27–34)
MCHC RBC-ENTMCNC: 32.7 GM/DL — SIGNIFICANT CHANGE UP (ref 32–36)
MCV RBC AUTO: 90.6 FL — SIGNIFICANT CHANGE UP (ref 80–100)
NRBC # BLD: 0 /100 WBCS — SIGNIFICANT CHANGE UP (ref 0–0)
PLATELET # BLD AUTO: 301 K/UL — SIGNIFICANT CHANGE UP (ref 150–400)
POTASSIUM SERPL-MCNC: 4.2 MMOL/L — SIGNIFICANT CHANGE UP (ref 3.5–5.3)
POTASSIUM SERPL-SCNC: 4.2 MMOL/L — SIGNIFICANT CHANGE UP (ref 3.5–5.3)
RBC # BLD: 4.9 M/UL — SIGNIFICANT CHANGE UP (ref 4.2–5.8)
RBC # FLD: 12.6 % — SIGNIFICANT CHANGE UP (ref 10.3–14.5)
SODIUM SERPL-SCNC: 137 MMOL/L — SIGNIFICANT CHANGE UP (ref 135–145)
WBC # BLD: 7.25 K/UL — SIGNIFICANT CHANGE UP (ref 3.8–10.5)
WBC # FLD AUTO: 7.25 K/UL — SIGNIFICANT CHANGE UP (ref 3.8–10.5)

## 2024-05-20 PROCEDURE — 92928 PRQ TCAT PLMT NTRAC ST 1 LES: CPT | Mod: RC

## 2024-05-20 PROCEDURE — 99152 MOD SED SAME PHYS/QHP 5/>YRS: CPT

## 2024-05-20 PROCEDURE — 93454 CORONARY ARTERY ANGIO S&I: CPT | Mod: 26,59

## 2024-05-20 PROCEDURE — 93010 ELECTROCARDIOGRAM REPORT: CPT | Mod: 76

## 2024-05-20 RX ORDER — LEVOTHYROXINE SODIUM 125 MCG
1 TABLET ORAL
Refills: 0 | DISCHARGE

## 2024-05-20 RX ORDER — ASPIRIN/CALCIUM CARB/MAGNESIUM 324 MG
1 TABLET ORAL
Refills: 0 | DISCHARGE

## 2024-05-20 RX ORDER — ACETAMINOPHEN 500 MG
650 TABLET ORAL EVERY 6 HOURS
Refills: 0 | Status: DISCONTINUED | OUTPATIENT
Start: 2024-05-20 | End: 2024-05-22

## 2024-05-20 RX ORDER — LOSARTAN POTASSIUM 100 MG/1
50 TABLET, FILM COATED ORAL DAILY
Refills: 0 | Status: DISCONTINUED | OUTPATIENT
Start: 2024-05-20 | End: 2024-05-22

## 2024-05-20 RX ORDER — LEVOTHYROXINE SODIUM 125 MCG
125 TABLET ORAL DAILY
Refills: 0 | Status: DISCONTINUED | OUTPATIENT
Start: 2024-05-20 | End: 2024-05-22

## 2024-05-20 RX ORDER — METOPROLOL TARTRATE 50 MG
1 TABLET ORAL
Refills: 0 | DISCHARGE

## 2024-05-20 RX ORDER — CLOPIDOGREL BISULFATE 75 MG/1
75 TABLET, FILM COATED ORAL DAILY
Refills: 0 | Status: DISCONTINUED | OUTPATIENT
Start: 2024-05-21 | End: 2024-05-22

## 2024-05-20 RX ORDER — OLMESARTAN MEDOXOMIL 5 MG/1
1 TABLET, FILM COATED ORAL
Refills: 0 | DISCHARGE

## 2024-05-20 RX ORDER — SODIUM CHLORIDE 9 MG/ML
250 INJECTION INTRAMUSCULAR; INTRAVENOUS; SUBCUTANEOUS ONCE
Refills: 0 | Status: COMPLETED | OUTPATIENT
Start: 2024-05-20 | End: 2024-05-20

## 2024-05-20 RX ORDER — ATORVASTATIN CALCIUM 80 MG/1
40 TABLET, FILM COATED ORAL AT BEDTIME
Refills: 0 | Status: DISCONTINUED | OUTPATIENT
Start: 2024-05-20 | End: 2024-05-22

## 2024-05-20 RX ORDER — METOPROLOL TARTRATE 50 MG
25 TABLET ORAL DAILY
Refills: 0 | Status: DISCONTINUED | OUTPATIENT
Start: 2024-05-20 | End: 2024-05-22

## 2024-05-20 RX ORDER — SODIUM CHLORIDE 9 MG/ML
500 INJECTION INTRAMUSCULAR; INTRAVENOUS; SUBCUTANEOUS
Refills: 0 | Status: DISCONTINUED | OUTPATIENT
Start: 2024-05-20 | End: 2024-05-22

## 2024-05-20 RX ORDER — ATORVASTATIN CALCIUM 80 MG/1
20 TABLET, FILM COATED ORAL AT BEDTIME
Refills: 0 | Status: DISCONTINUED | OUTPATIENT
Start: 2024-05-20 | End: 2024-05-20

## 2024-05-20 RX ORDER — SIMETHICONE 80 MG/1
80 TABLET, CHEWABLE ORAL EVERY 6 HOURS
Refills: 0 | Status: DISCONTINUED | OUTPATIENT
Start: 2024-05-20 | End: 2024-05-22

## 2024-05-20 RX ORDER — ALPRAZOLAM 0.25 MG
1 TABLET ORAL
Refills: 0 | DISCHARGE

## 2024-05-20 RX ORDER — SODIUM CHLORIDE 9 MG/ML
1000 INJECTION INTRAMUSCULAR; INTRAVENOUS; SUBCUTANEOUS
Refills: 0 | Status: DISCONTINUED | OUTPATIENT
Start: 2024-05-20 | End: 2024-05-22

## 2024-05-20 RX ORDER — ASPIRIN/CALCIUM CARB/MAGNESIUM 324 MG
81 TABLET ORAL DAILY
Refills: 0 | Status: DISCONTINUED | OUTPATIENT
Start: 2024-05-20 | End: 2024-05-22

## 2024-05-20 RX ADMIN — SODIUM CHLORIDE 75 MILLILITER(S): 9 INJECTION INTRAMUSCULAR; INTRAVENOUS; SUBCUTANEOUS at 11:55

## 2024-05-20 RX ADMIN — ATORVASTATIN CALCIUM 20 MILLIGRAM(S): 80 TABLET, FILM COATED ORAL at 21:22

## 2024-05-20 RX ADMIN — Medication 650 MILLIGRAM(S): at 22:21

## 2024-05-20 RX ADMIN — SODIUM CHLORIDE 100 MILLILITER(S): 9 INJECTION INTRAMUSCULAR; INTRAVENOUS; SUBCUTANEOUS at 15:03

## 2024-05-20 RX ADMIN — Medication 650 MILLIGRAM(S): at 21:21

## 2024-05-20 RX ADMIN — SODIUM CHLORIDE 750 MILLILITER(S): 9 INJECTION INTRAMUSCULAR; INTRAVENOUS; SUBCUTANEOUS at 11:55

## 2024-05-20 NOTE — DISCHARGE NOTE PROVIDER - HOSPITAL COURSE
64 y/o M with Pmhx of HTN, hypothyroidism, HLD referred by Dr. Miller underwent a LHC on 5/20/24.   Catheterization: GUS to mid RCA via RRA. (view catheterization report for full details). Post procedure, radial band was removed according to protocol without complication. Patient remained hemodynamically stable, neurovascularly intact and chest pain free. Patient voided and ambulated and had no EKG changes post procedure.  He remained overnight for staged PCI of LAD on 5/21/24    The remainder of his hospitalization was uneventful.  He was provided education regarding DAPT/statin therapy, as well as post procedure wound care instructions.  He will follow up with his private cardiologist Dr. Miller 2 weeks and advised to return to ER with any concerning symptoms.     Cardiac Rehab Post PCI:              *Education on benefits of Cardiac Rehab provided to patient: Yes         *Referral and Prescription Given for Cardiac Rehab : Yes         *Pt given list of locations & instructed to contact their insurance company to review list of participating providers: Yes         *Pt instructed to bring Cardiac Rehab prescription with them to Cardiology Follow up appointment for assistance with enrollment: Yes         *Pt discharged with copies detail cardiovascular history, medications, testing/treatments: Yes                   62 yo man with history of HTN, hypothyroidism, HLD, presents as outpatient for cardiac cath to further evaluate palpitations and atypical chest pain. Pt had nuclear stress test with ischemic finding and was subsequently referred to interventional Cardiology for elective LHC.     5/20 s/p LHC with PCI GUS x 1 RCA via RRA access.  Patient tolerated well, post PCI EKG without acute changes.  RRA band removed without incident.  Patient nadmitted to telemetry overnight for staged PCI on 5/21.  5/21 No acute overnight events, no arrhythmic events on telemetry                   Cardiac Rehab (STEMI/NSTEMI/ACS/Unstable Angina/CHF/Chronic Stable Angina/Heart Surgery (CABG,Valve)/Post PCI):              *Education on benefits of Cardiac Rehab provided to patient: Yes         *Referral and Prescription Given for Cardiac Rehab : Yes         *Pt given list of locations & instructed to contact their insurance company to review list of participating providers: Yes         *Pt instructed to bring Cardiac Rehab prescription with them to Cardiology Follow up appointment for assistance with enrollment: Yes         *Pt discharged with copies detail cardiovascular history, medications, testing/treatments: Yes      ****** Reasons for NO Cardiac rehab referral rx:              Patient Refused            Medical Reason: ex has Home Care, Home PT, incomplete revascularization            Patient lacks medical coverage for Cardiac Rehab            Pt discharged to Nursing Care/COLE/Long term Care Facility            Patient Lacks Transportation or no cardiac rehab within 60 minutes driving range            Patient already participates in Cardiac Rehab            Other: (provide details) ex: Hospice patient                             64 yo man with history of HTN, hypothyroidism, HLD, presents as outpatient for cardiac cath to further evaluate palpitations and atypical chest pain. Pt had nuclear stress test with ischemic finding and was subsequently referred to interventional Cardiology for elective LHC.     5/20 s/p LHC with PCI GUS x 1 RCA via RRA access.  Patient tolerated well, post PCI EKG without acute changes.  RRA band removed without incident.  Patient nadmitted to telemetry overnight for staged PCI on 5/21.  5/21 No acute overnight events, no arrhythmic events on telemetry.  S/P staged PCI GUS x 3 LAD via RRA access.  Patient tolerated well, post PCI EKG without acute changes    Cardiac Rehab (STEMI/NSTEMI/ACS/Unstable Angina/CHF/Chronic Stable Angina/Heart Surgery (CABG,Valve)/Post PCI):              *Education on benefits of Cardiac Rehab provided to patient: Yes         *Referral and Prescription Given for Cardiac Rehab : Yes         *Pt given list of locations & instructed to contact their insurance company to review list of participating providers: Yes         *Pt instructed to bring Cardiac Rehab prescription with them to Cardiology Follow up appointment for assistance with enrollment: Yes         *Pt discharged with copies detail cardiovascular history, medications, testing/treatments: Yes      ****** Reasons for NO Cardiac rehab referral rx:              Patient Refused            Medical Reason: ex has Home Care, Home PT, incomplete revascularization            Patient lacks medical coverage for Cardiac Rehab            Pt discharged to Nursing Care/COLE/Long term Care Facility            Patient Lacks Transportation or no cardiac rehab within 60 minutes driving range            Patient already participates in Cardiac Rehab            Other: (provide details) ex: Hospice patient                             64 yo man with history of HTN, hypothyroidism, HLD, presents as outpatient for cardiac cath to further evaluate palpitations and atypical chest pain. Pt had nuclear stress test with ischemic finding and was subsequently referred to interventional Cardiology for elective LHC.     5/20 s/p LHC with PCI GUS x 1 RCA via RRA access.  Patient tolerated well, post PCI EKG without acute changes.  RRA band removed without incident.  Patient nadmitted to telemetry overnight for staged PCI on 5/21.  5/21 No acute overnight events, no arrhythmic events on telemetry.  S/P staged PCI GUS x 3 LAD via RRA access.  Patient tolerated well, post PCI EKG without acute changes    Cardiac Rehab (STEMI/NSTEMI/ACS/Unstable Angina/CHF/Chronic Stable Angina/Heart Surgery (CABG,Valve)/Post PCI):              *Education on benefits of Cardiac Rehab provided to patient: Yes         *Referral and Prescription Given for Cardiac Rehab : Yes         *Pt given list of locations & instructed to contact their insurance company to review list of participating providers: Yes         *Pt instructed to bring Cardiac Rehab prescription with them to Cardiology Follow up appointment for assistance with enrollment: Yes         *Pt discharged with copies detail cardiovascular history, medications, testing/treatments: Yes

## 2024-05-20 NOTE — DISCHARGE NOTE PROVIDER - NSDCCPTREATMENT_GEN_ALL_CORE_FT
PRINCIPAL PROCEDURE  Procedure: Coronary angiogram  Findings and Treatment:      PRINCIPAL PROCEDURE  Procedure: Coronary stent placement  Findings and Treatment: 5/20 [facility] with PCI GUS x 1 RCA, Staged PCI GUS x 3 LAD     PRINCIPAL PROCEDURE  Procedure: Coronary stent placement  Findings and Treatment: 5/20 [facility] with PCI, RCA x1 GUS  5/21 Staged PCI to LAD x3 GUS

## 2024-05-20 NOTE — DISCHARGE NOTE PROVIDER - CARE PROVIDER_API CALL
Rishabh Miller  Cardiology  [address] [place] [address]  Phone: [phone]  Fax: [phone]  Follow Up Time:    Rishabh Miller  Cardiology  [address] [place] [address]  Phone: [phone]  Fax: [phone]  Follow Up Time: 1 week

## 2024-05-20 NOTE — DISCHARGE NOTE PROVIDER - NSDCFUADDINST_GEN_ALL_CORE_FT
Cardiac Rehab Post PCI:              *Education on benefits of Cardiac Rehab provided to patient: Yes         *Referral and Prescription Given for Cardiac Rehab : Yes         *Pt given list of locations & instructed to contact their insurance company to review list of participating providers: Yes         *Pt instructed to bring Cardiac Rehab prescription with them to Cardiology Follow up appointment for assistance with enrollment: Yes         *Pt discharged with copies detail cardiovascular history, medications, testing/treatments: Yes       We have provided you with a prescription for cardiac rehab which is medically supervised exercise program for your heart and has been shown to improve the quantity and quality of life of people with heart disease like yours. You should attend cardiac rehab 3 times per week for 12 weeks. We have provided you with a list of nearby facilities. Please call your insurance carrier to determine which of these facilities are covered under your plan. Please bring this prescription with you to your follow up appointment with your cardiologist who can then further assist you to enroll into a cardiac rehab program.

## 2024-05-20 NOTE — DISCHARGE NOTE PROVIDER - NSDCACTIVITY_GEN_ALL_CORE
Showering allowed Showering allowed/Walking - Indoors allowed/Walking - Outdoors allowed Showering allowed/Stairs allowed/Walking - Indoors allowed/No heavy lifting/straining/Walking - Outdoors allowed

## 2024-05-20 NOTE — DISCHARGE NOTE PROVIDER - NSDCCPCAREPLAN_GEN_ALL_CORE_FT
PRINCIPAL DISCHARGE DIAGNOSIS  Diagnosis: CAD (coronary artery disease)  Assessment and Plan of Treatment: Continue aspirin and Plavix, do not stop unless instructed by your physician.  Continue low salt, low cholesterol, low fat, fat, cholesterol and carbohydrate diet. Follow up with cardiologist and primary care physician's routine appointment.       PRINCIPAL DISCHARGE DIAGNOSIS  Diagnosis: CAD (coronary artery disease)  Assessment and Plan of Treatment: Continue aspirin and Plavix, do not stop unless instructed by your physician.  Continue low salt, low cholesterol, low fat, fat, cholesterol and carbohydrate diet. Follow up with cardiologist and primary care physician's routine appointment.        SECONDARY DISCHARGE DIAGNOSES  Diagnosis: HTN (hypertension)  Assessment and Plan of Treatment: Continue with your blood pressure medications; eat a heart healthy diet with low salt diet; exercise regularly (consult with your physician or cardiologist first); maintain a heart healthy weight; if you smoke - quit (A resource to help you stop smoking is the [employer] [facility] [employer] – phone number [phone].); include healthy ways to manage stress. Continue to follow with your primary care physician or cardiologist.    Diagnosis: HLD (hyperlipidemia)  Assessment and Plan of Treatment: Continue with your cholesterol medications. Eat a heart healthy diet that is low in saturated fats and salt, and includes whole grains, fruits, vegetables and lean protein; exercise regularly (consult with your physician or cardiologist first); maintain a heart healthy weight; if you smoke - quit (A resource to help you stop smoking is the [facility] Tobacco [facility] – phone number [phone].). Continue to follow with your primary physician or cardiologist.

## 2024-05-20 NOTE — H&P CARDIOLOGY - HISTORY OF PRESENT ILLNESS
This is a 62 yo man with history of HTN, hypothyroidism, HLD, presents as outpatient for cardiac cath to further evaluate palpitations and atypical chest pain. Pt had nuclear stress test results below.   referring MD Dr. Miller   denies implanted cardiac devices     Conclusions: nuclear stress test 4/2024   1. Myocardial Perfusion: Probably Normal.  2. Baseline electrocardiogram: sinus bradycardia at a rate of 51 bpm with no arrhythmias.  3. Arrhythmias: No arrhythmia associated with stress.  4. Electrocardiogram ischemic changes: Equivocal Changes in ST Segment of V4-V6.  5. Qualitative Perfusion:  - small-sized, mild defect(s) in the basal inferior wall that is predominantly fixed, predominantly corrects with prone imaging consistent with no clear evidence of ischemia/infarction.  6. The left ventricle is normal in function and normal in size. The resting left ventricular EF is 70 %. The resting end diastolic volume is 77 ml and systolic volume is 23 ml.

## 2024-05-20 NOTE — DISCHARGE NOTE PROVIDER - NSDCMRMEDTOKEN_GEN_ALL_CORE_FT
aspirin 81 mg oral tablet: 1 tab(s) orally once a day  atorvastatin 10 mg oral tablet: 1 tab(s) orally once a day  metoprolol succinate 25 mg oral tablet, extended release: 1 tab(s) orally once a day  olmesartan 20 mg oral tablet: 1 tab(s) orally once a day  PARoxetine 25 mg oral tablet, extended release: 1 tab(s) orally once a day  Synthroid 125 mcg (0.125 mg) oral tablet: 1 tab(s) orally once a day  Xanax 0.5 mg oral tablet: 1 tab(s) orally once a day as needed for  anxiety   aspirin 81 mg oral tablet: 1 tab(s) orally once a day  atorvastatin 10 mg oral tablet: 1 tab(s) orally once a day  Cardiac rehab 2-3 times a week x 12 weeks for Coronary artery disease post PCI: Cardiac rehab 2-3 times a week x 12 weeks for Coronary artery disease post PCI MDD: 0  metoprolol succinate 25 mg oral tablet, extended release: 1 tab(s) orally once a day  olmesartan 20 mg oral tablet: 1 tab(s) orally once a day  PARoxetine 25 mg oral tablet, extended release: 1 tab(s) orally once a day  Synthroid 125 mcg (0.125 mg) oral tablet: 1 tab(s) orally once a day  Xanax 0.5 mg oral tablet: 1 tab(s) orally once a day as needed for  anxiety   aspirin 81 mg oral tablet: 1 tab(s) orally once a day  atorvastatin 40 mg oral tablet: 1 tab(s) orally once a day (at bedtime) MDD: 1  Cardiac rehab 2-3 times a week x 12 weeks for Coronary artery disease post PCI: Cardiac rehab 2-3 times a week x 12 weeks for Coronary artery disease post PCI MDD: 0  clopidogrel 75 mg oral tablet: 1 tab(s) orally once a day MDD: 1  metoprolol succinate 25 mg oral tablet, extended release: 1 tab(s) orally once a day  olmesartan 20 mg oral tablet: 1 tab(s) orally once a day  PARoxetine 25 mg oral tablet, extended release: 1 tab(s) orally once a day  Synthroid 125 mcg (0.125 mg) oral tablet: 1 tab(s) orally once a day  Xanax 0.5 mg oral tablet: 1 tab(s) orally once a day as needed for  anxiety   aspirin 81 mg oral tablet: 1 tab(s) orally once a day  atorvastatin 40 mg oral tablet: 1 tab(s) orally once a day (at bedtime) MDD: 1  clopidogrel 75 mg oral tablet: 1 tab(s) orally once a day MDD: 1  metoprolol succinate 25 mg oral tablet, extended release: 1 tab(s) orally once a day  olmesartan 20 mg oral tablet: 1 tab(s) orally once a day  PARoxetine 25 mg oral tablet, extended release: 1 tab(s) orally once a day  Synthroid 125 mcg (0.125 mg) oral tablet: 1 tab(s) orally once a day  Xanax 0.5 mg oral tablet: 1 tab(s) orally once a day as needed for  anxiety

## 2024-05-20 NOTE — ASU PATIENT PROFILE, ADULT - FALL HARM RISK - UNIVERSAL INTERVENTIONS
Bed in lowest position, wheels locked, appropriate side rails in place/Call bell, personal items and telephone in reach/Instruct patient to call for assistance before getting out of bed or chair/Non-slip footwear when patient is out of bed/[place] to call system/Purposeful Proactive Rounding/Room/bathroom lighting operational, light cord in reach

## 2024-05-20 NOTE — PATIENT PROFILE ADULT - MEDICATIONS/VISITS
no
impairments found/rehab potential/therapy frequency/predicted duration of therapy intervention/anticipated discharge recommendation
impairments found/rehab potential
impairments found/rehab potential/therapy frequency/predicted duration of therapy intervention/anticipated discharge recommendation

## 2024-05-21 LAB
ANION GAP SERPL CALC-SCNC: 11 MMOL/L — SIGNIFICANT CHANGE UP (ref 5–17)
BUN SERPL-MCNC: 28 MG/DL — HIGH (ref 7–23)
CALCIUM SERPL-MCNC: 9.1 MG/DL — SIGNIFICANT CHANGE UP (ref 8.4–10.5)
CHLORIDE SERPL-SCNC: 105 MMOL/L — SIGNIFICANT CHANGE UP (ref 96–108)
CO2 SERPL-SCNC: 24 MMOL/L — SIGNIFICANT CHANGE UP (ref 22–31)
CREAT SERPL-MCNC: 1.26 MG/DL — SIGNIFICANT CHANGE UP (ref 0.5–1.3)
EGFR: 63 ML/MIN/1.73M2 — SIGNIFICANT CHANGE UP
GLUCOSE SERPL-MCNC: 91 MG/DL — SIGNIFICANT CHANGE UP (ref 70–99)
HCT VFR BLD CALC: 39.1 % — SIGNIFICANT CHANGE UP (ref 39–50)
HGB BLD-MCNC: 13.1 G/DL — SIGNIFICANT CHANGE UP (ref 13–17)
MCHC RBC-ENTMCNC: 30 PG — SIGNIFICANT CHANGE UP (ref 27–34)
MCHC RBC-ENTMCNC: 33.5 GM/DL — SIGNIFICANT CHANGE UP (ref 32–36)
MCV RBC AUTO: 89.5 FL — SIGNIFICANT CHANGE UP (ref 80–100)
NRBC # BLD: 0 /100 WBCS — SIGNIFICANT CHANGE UP (ref 0–0)
PLATELET # BLD AUTO: 248 K/UL — SIGNIFICANT CHANGE UP (ref 150–400)
POTASSIUM SERPL-MCNC: 4.4 MMOL/L — SIGNIFICANT CHANGE UP (ref 3.5–5.3)
POTASSIUM SERPL-SCNC: 4.4 MMOL/L — SIGNIFICANT CHANGE UP (ref 3.5–5.3)
RBC # BLD: 4.37 M/UL — SIGNIFICANT CHANGE UP (ref 4.2–5.8)
RBC # FLD: 12.5 % — SIGNIFICANT CHANGE UP (ref 10.3–14.5)
SODIUM SERPL-SCNC: 140 MMOL/L — SIGNIFICANT CHANGE UP (ref 135–145)
WBC # BLD: 8 K/UL — SIGNIFICANT CHANGE UP (ref 3.8–10.5)
WBC # FLD AUTO: 8 K/UL — SIGNIFICANT CHANGE UP (ref 3.8–10.5)

## 2024-05-21 PROCEDURE — 99232 SBSQ HOSP IP/OBS MODERATE 35: CPT | Mod: FS

## 2024-05-21 PROCEDURE — 92928 PRQ TCAT PLMT NTRAC ST 1 LES: CPT | Mod: LD

## 2024-05-21 PROCEDURE — 93010 ELECTROCARDIOGRAM REPORT: CPT

## 2024-05-21 PROCEDURE — 92978 ENDOLUMINL IVUS OCT C 1ST: CPT | Mod: 26,LD

## 2024-05-21 PROCEDURE — 99152 MOD SED SAME PHYS/QHP 5/>YRS: CPT

## 2024-05-21 RX ORDER — SODIUM CHLORIDE 9 MG/ML
1000 INJECTION INTRAMUSCULAR; INTRAVENOUS; SUBCUTANEOUS
Refills: 0 | Status: DISCONTINUED | OUTPATIENT
Start: 2024-05-21 | End: 2024-05-22

## 2024-05-21 RX ORDER — ATORVASTATIN CALCIUM 80 MG/1
1 TABLET, FILM COATED ORAL
Refills: 0 | DISCHARGE

## 2024-05-21 RX ORDER — ATORVASTATIN CALCIUM 80 MG/1
1 TABLET, FILM COATED ORAL
Qty: 30 | Refills: 1
Start: 2024-05-21 | End: 2024-07-19

## 2024-05-21 RX ORDER — CLOPIDOGREL BISULFATE 75 MG/1
1 TABLET, FILM COATED ORAL
Qty: 90 | Refills: 3
Start: 2024-05-21 | End: 2025-05-15

## 2024-05-21 RX ORDER — SODIUM CHLORIDE 9 MG/ML
250 INJECTION INTRAMUSCULAR; INTRAVENOUS; SUBCUTANEOUS ONCE
Refills: 0 | Status: COMPLETED | OUTPATIENT
Start: 2024-05-21 | End: 2024-05-21

## 2024-05-21 RX ADMIN — Medication 125 MICROGRAM(S): at 05:26

## 2024-05-21 RX ADMIN — ATORVASTATIN CALCIUM 40 MILLIGRAM(S): 80 TABLET, FILM COATED ORAL at 20:50

## 2024-05-21 RX ADMIN — LOSARTAN POTASSIUM 50 MILLIGRAM(S): 100 TABLET, FILM COATED ORAL at 05:26

## 2024-05-21 RX ADMIN — CLOPIDOGREL BISULFATE 75 MILLIGRAM(S): 75 TABLET, FILM COATED ORAL at 05:25

## 2024-05-21 RX ADMIN — SODIUM CHLORIDE 750 MILLILITER(S): 9 INJECTION INTRAMUSCULAR; INTRAVENOUS; SUBCUTANEOUS at 07:39

## 2024-05-21 RX ADMIN — SIMETHICONE 80 MILLIGRAM(S): 80 TABLET, CHEWABLE ORAL at 05:26

## 2024-05-21 RX ADMIN — Medication 20 MILLIGRAM(S): at 11:58

## 2024-05-21 RX ADMIN — SODIUM CHLORIDE 100 MILLILITER(S): 9 INJECTION INTRAMUSCULAR; INTRAVENOUS; SUBCUTANEOUS at 17:51

## 2024-05-21 RX ADMIN — Medication 650 MILLIGRAM(S): at 21:20

## 2024-05-21 RX ADMIN — Medication 81 MILLIGRAM(S): at 05:25

## 2024-05-21 RX ADMIN — Medication 25 MILLIGRAM(S): at 05:26

## 2024-05-21 RX ADMIN — Medication 650 MILLIGRAM(S): at 20:50

## 2024-05-22 ENCOUNTER — TRANSCRIPTION ENCOUNTER (OUTPATIENT)
Age: 64
End: 2024-05-22

## 2024-05-22 VITALS
DIASTOLIC BLOOD PRESSURE: 82 MMHG | OXYGEN SATURATION: 97 % | SYSTOLIC BLOOD PRESSURE: 156 MMHG | HEART RATE: 71 BPM | RESPIRATION RATE: 18 BRPM | TEMPERATURE: 99 F

## 2024-05-22 DIAGNOSIS — E78.5 HYPERLIPIDEMIA, UNSPECIFIED: ICD-10-CM

## 2024-05-22 DIAGNOSIS — I25.10 ATHEROSCLEROTIC HEART DISEASE OF NATIVE CORONARY ARTERY WITHOUT ANGINA PECTORIS: ICD-10-CM

## 2024-05-22 DIAGNOSIS — I10 ESSENTIAL (PRIMARY) HYPERTENSION: ICD-10-CM

## 2024-05-22 LAB
ANION GAP SERPL CALC-SCNC: 12 MMOL/L — SIGNIFICANT CHANGE UP (ref 5–17)
BUN SERPL-MCNC: 19 MG/DL — SIGNIFICANT CHANGE UP (ref 7–23)
CALCIUM SERPL-MCNC: 9.2 MG/DL — SIGNIFICANT CHANGE UP (ref 8.4–10.5)
CHLORIDE SERPL-SCNC: 103 MMOL/L — SIGNIFICANT CHANGE UP (ref 96–108)
CO2 SERPL-SCNC: 23 MMOL/L — SIGNIFICANT CHANGE UP (ref 22–31)
CREAT SERPL-MCNC: 1.05 MG/DL — SIGNIFICANT CHANGE UP (ref 0.5–1.3)
EGFR: 79 ML/MIN/1.73M2 — SIGNIFICANT CHANGE UP
GLUCOSE SERPL-MCNC: 79 MG/DL — SIGNIFICANT CHANGE UP (ref 70–99)
HCT VFR BLD CALC: 40.3 % — SIGNIFICANT CHANGE UP (ref 39–50)
HGB BLD-MCNC: 13.6 G/DL — SIGNIFICANT CHANGE UP (ref 13–17)
MCHC RBC-ENTMCNC: 29.7 PG — SIGNIFICANT CHANGE UP (ref 27–34)
MCHC RBC-ENTMCNC: 33.7 GM/DL — SIGNIFICANT CHANGE UP (ref 32–36)
MCV RBC AUTO: 88 FL — SIGNIFICANT CHANGE UP (ref 80–100)
NRBC # BLD: 0 /100 WBCS — SIGNIFICANT CHANGE UP (ref 0–0)
PLATELET # BLD AUTO: 238 K/UL — SIGNIFICANT CHANGE UP (ref 150–400)
POTASSIUM SERPL-MCNC: 3.9 MMOL/L — SIGNIFICANT CHANGE UP (ref 3.5–5.3)
POTASSIUM SERPL-SCNC: 3.9 MMOL/L — SIGNIFICANT CHANGE UP (ref 3.5–5.3)
RBC # BLD: 4.58 M/UL — SIGNIFICANT CHANGE UP (ref 4.2–5.8)
RBC # FLD: 12.5 % — SIGNIFICANT CHANGE UP (ref 10.3–14.5)
SODIUM SERPL-SCNC: 138 MMOL/L — SIGNIFICANT CHANGE UP (ref 135–145)
WBC # BLD: 8.33 K/UL — SIGNIFICANT CHANGE UP (ref 3.8–10.5)
WBC # FLD AUTO: 8.33 K/UL — SIGNIFICANT CHANGE UP (ref 3.8–10.5)

## 2024-05-22 PROCEDURE — C1894: CPT

## 2024-05-22 PROCEDURE — C1753: CPT

## 2024-05-22 PROCEDURE — 93005 ELECTROCARDIOGRAM TRACING: CPT

## 2024-05-22 PROCEDURE — 36415 COLL VENOUS BLD VENIPUNCTURE: CPT

## 2024-05-22 PROCEDURE — 99232 SBSQ HOSP IP/OBS MODERATE 35: CPT

## 2024-05-22 PROCEDURE — 85027 COMPLETE CBC AUTOMATED: CPT

## 2024-05-22 PROCEDURE — C9600: CPT | Mod: RC

## 2024-05-22 PROCEDURE — 80048 BASIC METABOLIC PNL TOTAL CA: CPT

## 2024-05-22 PROCEDURE — C1874: CPT

## 2024-05-22 PROCEDURE — 92978 ENDOLUMINL IVUS OCT C 1ST: CPT | Mod: LD

## 2024-05-22 PROCEDURE — 93454 CORONARY ARTERY ANGIO S&I: CPT | Mod: 59

## 2024-05-22 PROCEDURE — C1769: CPT

## 2024-05-22 PROCEDURE — C1887: CPT

## 2024-05-22 RX ADMIN — Medication 125 MICROGRAM(S): at 05:32

## 2024-05-22 RX ADMIN — LOSARTAN POTASSIUM 50 MILLIGRAM(S): 100 TABLET, FILM COATED ORAL at 05:32

## 2024-05-22 RX ADMIN — Medication 25 MILLIGRAM(S): at 05:32

## 2024-05-22 RX ADMIN — CLOPIDOGREL BISULFATE 75 MILLIGRAM(S): 75 TABLET, FILM COATED ORAL at 05:32

## 2024-05-22 RX ADMIN — Medication 81 MILLIGRAM(S): at 05:32

## 2024-05-22 NOTE — DISCHARGE NOTE NURSING/CASE MANAGEMENT/SOCIAL WORK - PATIENT PORTAL LINK FT
You can access the FollowMyHealth Patient Portal offered by [facility] by registering at the following website: [email or web address]. By joining [facility]’s FollowMyHealth portal, you will also be able to view your health information using other applications (apps) compatible with our system.

## 2024-05-22 NOTE — PROGRESS NOTE ADULT - ASSESSMENT
64 y/o male with above pmhx, 5/20 cardiac cath with one stent to the mid RCA, 5/21 staged CPI to the LAD with 3 stents placed via right radial artery access.

## 2024-05-22 NOTE — PROGRESS NOTE ADULT - SUBJECTIVE AND OBJECTIVE BOX
HPI:  64 yo man with history of HTN, hypothyroidism, HLD, presents as outpatient for cardiac cath to further evaluate palpitations and atypical chest pain.     Events: Underwent GUS to RCA w plan for PCI to LAD    Review Of Systems:  Constitutional: denies fever, chills, Fatigue   HEENT: denies Blurred vision, Eye Pain, Headache   Respiratory: denies Cough, Wheezing , Shortness of breath  Cardiovascular: denies Chest Pain, Palpitations,  LEWIS   Gastrointestinal: denies Abdominal Pain, Diarrhea, Constipation   Genitourinary: denies Nocturia, Dysuria, Incontinence  Extremities: denies Swelling, Joint Pain  Neurologic: denies Focal deficit, Paresthesias, Syncope  Lymphatic: denies Swelling, Lymphadenopathy   Skin: denies Rash, Ecchymoses, Wounds   Psychiatry: denies Depression, Suicidal/Homicidal Ideation, anxiety  [X ] 10 point review of systems is otherwise negative except as mentioned above         Medications:  acetaminophen     Tablet .. 650 milliGRAM(s) Oral every 6 hours PRN  aspirin enteric coated 81 milliGRAM(s) Oral daily  atorvastatin 20 milliGRAM(s) Oral at bedtime  levothyroxine 125 MICROGram(s) Oral daily  losartan 50 milliGRAM(s) Oral daily  metoprolol succinate ER 25 milliGRAM(s) Oral daily  PARoxetine 20 milliGRAM(s) Oral daily  sodium chloride 0.9%. 500 milliLiter(s) IV Continuous <Continuous>  sodium chloride 0.9%. 1000 milliLiter(s) IV Continuous <Continuous>    Vitals:  T(C): 36.8 (24 @ 14:10), Max: 36.8 (24 @ 14:10)  HR: 68 (24 @ 19:25) (52 - 85)  BP: 133/62 (24 @ 19:25) (100/55 - 149/65)  BP(mean): 87 (24 @ 19:25) (76 - 96)  RR: 16 (24 @ 19:25) (15 - 18)  SpO2: 97% (24 @ 19:25) (95% - 100%)    Daily Height in cm: 162.56 (20 May 2024 11:21)    Daily Weight in k.5 (20 May 2024 11:19)  I&O's Summary    Physical Exam:  Appearance: [X ] Normal [ X] NAD  Eyes: [X ] PERRL [ X] EOMI  HENT: [ X] Normal oral [place] [ X]NC/AT  Cardiovascular: [ X] S1 [X ] S2 [ X]   Procedural Access Site: RRA access site C/D/I  Respiratory: [ X] Clear to auscultation bilaterally  Gastrointestinal: [X ] Soft [ X] Non-tender [X ] Non-distended   Musculoskeletal: [X ] No clubbing [ X] No joint deformity   Neurologic: [X ] Non-focal  Lymphatic: [X ] No lymphadenopathy  Psychiatry: [ X] AAOx3 [X ] Mood & affect appropriate  Skin: X[ ] No rashes [X ] No ecchymoses [X ] No cyanosis    05-    137  |  101  |  29<H>  ----------------------------<  94  4.2   |  24  |  1.28    Ca    10.2      20 May 2024 11:44    ECG:    Stress Testing: Conclusions: nuclear stress test 2024   1. Myocardial Perfusion: Probably Normal.  2. Baseline electrocardiogram: sinus bradycardia at a rate of 51 bpm with no arrhythmias.  3. Arrhythmias: No arrhythmia associated with stress.  4. Electrocardiogram ischemic changes: Equivocal Changes in ST Segment of V4-V6.  5. Qualitative Perfusion:  - small-sized, mild defect(s) in the basal inferior wall that is predominantly fixed, predominantly corrects with prone imaging consistent with no clear evidence of ischemia/infarction.  6. The left ventricle is normal in function and normal in size. The resting left ventricular EF is 70 %. The resting end diastolic volume is 77 ml and systolic volume is 23 ml.    Interpretation of Telemetry: SR 62    Assessment: 64 yo man with history of HTN, hypothyroidism, HLD, s/p GUS to RCA w plan for PCI to LAD    Neuro/Psych:  - Continue home paxil  - OOB and ambulate    Cardiovascular  CAD s/p GUS to RCA  - Plan for staged procedure in AM to LAD  - Continue ASA, plavix. Lipitor increased to 40 mg  - Continue losartan and BB  - SBP 130s and appropriate    Hypothyroid  - Continue home Levothyroxine    Dispo  - Possible dc home following PCI if hemodynamically stable  
Patient is a 63y old  Male who presents with a chief complaint of chest pain and abnormal stress test (20 May 2024 16:42)          Allergies    penicillin (Unknown)    Intolerances        Medications:  acetaminophen     Tablet .. 650 milliGRAM(s) Oral every 6 hours PRN  aspirin enteric coated 81 milliGRAM(s) Oral daily  atorvastatin 40 milliGRAM(s) Oral at bedtime  clopidogrel Tablet 75 milliGRAM(s) Oral daily  levothyroxine 125 MICROGram(s) Oral daily  losartan 50 milliGRAM(s) Oral daily  metoprolol succinate ER 25 milliGRAM(s) Oral daily  PARoxetine 20 milliGRAM(s) Oral daily  simethicone 80 milliGRAM(s) Chew every 6 hours PRN  sodium chloride 0.9%. 1000 milliLiter(s) IV Continuous <Continuous>  sodium chloride 0.9%. 1000 milliLiter(s) IV Continuous <Continuous>  sodium chloride 0.9%. 500 milliLiter(s) IV Continuous <Continuous>      Vitals:  T(C): 36.7 (05-21-24 @ 23:00), Max: 37 (05-21-24 @ 17:15)  HR: 58 (05-21-24 @ 23:00) (56 - 82)  BP: 157/75 (05-21-24 @ 23:00) (116/57 - 164/78)  BP(mean): 108 (05-21-24 @ 23:00) (82 - 114)  RR: 17 (05-21-24 @ 23:00) (16 - 20)  SpO2: 96% (05-21-24 @ 23:00) (93% - 97%)  Wt(kg): --  Daily     Daily   I&O's Summary    21 May 2024 07:01  -  22 May 2024 02:09  --------------------------------------------------------  IN: 120 mL / OUT: 300 mL / NET: -180 mL          Physical Exam:  Appearance: Normal  Eyes: PERRL, EOMI  HENT: Normal oral [place], NC/AT  Cardiovascular: S1S2, RRR, No M/R/G, no JVD, No Lower extremity edema  Procedural Access Site: No hematoma, Non-tender to palpation, 2+ pulse, No bruit, No Ecchymosis  Respiratory: Clear to auscultation bilaterally  Gastrointestinal: Soft, Non tender, Normal Bowel Sounds  Musculoskeletal: No clubbing, No joint deformity   Neurologic: Non-focal  Lymphatic: No lymphadenopathy  Psychiatry: AAOx3, Mood & affect appropriate  Skin: No rashes, No ecchymoses, No cyanosis    05-22    138  |  103  |  19  ----------------------------<  79  3.9   |  23  |  1.05    Ca    9.2      22 May 2024 01:10              Lipid panel   Hgb A1c                         13.6   8.33  )-----------( 238      ( 22 May 2024 01:10 )             40.3         ECG: SB 53 bpm

## 2024-05-24 PROBLEM — I10 ESSENTIAL (PRIMARY) HYPERTENSION: Chronic | Status: ACTIVE | Noted: 2024-05-20

## 2024-05-24 PROBLEM — E03.9 HYPOTHYROIDISM, UNSPECIFIED: Chronic | Status: ACTIVE | Noted: 2024-05-20

## 2024-05-24 PROBLEM — E78.5 HYPERLIPIDEMIA, UNSPECIFIED: Chronic | Status: ACTIVE | Noted: 2024-05-20

## 2024-05-29 ENCOUNTER — APPOINTMENT (OUTPATIENT)
Dept: CARDIOLOGY | Facility: CLINIC | Age: 64
End: 2024-05-29
Payer: MEDICARE

## 2024-05-29 ENCOUNTER — NON-APPOINTMENT (OUTPATIENT)
Age: 64
End: 2024-05-29

## 2024-05-29 VITALS
HEART RATE: 60 BPM | DIASTOLIC BLOOD PRESSURE: 60 MMHG | OXYGEN SATURATION: 98 % | WEIGHT: 127 LBS | BODY MASS INDEX: 21.68 KG/M2 | HEIGHT: 64 IN | SYSTOLIC BLOOD PRESSURE: 108 MMHG

## 2024-05-29 DIAGNOSIS — Z98.61 CORONARY ANGIOPLASTY STATUS: ICD-10-CM

## 2024-05-29 DIAGNOSIS — I25.10 ATHEROSCLEROTIC HEART DISEASE OF NATIVE CORONARY ARTERY W/OUT ANGINA PECTORIS: ICD-10-CM

## 2024-05-29 PROCEDURE — 93000 ELECTROCARDIOGRAM COMPLETE: CPT

## 2024-05-29 PROCEDURE — G2211 COMPLEX E/M VISIT ADD ON: CPT | Mod: NC

## 2024-05-29 PROCEDURE — 99214 OFFICE O/P EST MOD 30 MIN: CPT

## 2024-05-29 PROCEDURE — 99496 TRANSJ CARE MGMT HIGH F2F 7D: CPT

## 2024-06-17 RX ORDER — ATORVASTATIN CALCIUM 40 MG/1
40 TABLET, FILM COATED ORAL DAILY
Qty: 90 | Refills: 3 | Status: ACTIVE | COMMUNITY

## 2024-06-17 RX ORDER — LEVOTHYROXINE SODIUM 125 UG/1
125 TABLET ORAL DAILY
Refills: 0 | Status: ACTIVE | COMMUNITY

## 2024-06-17 RX ORDER — ASPIRIN ENTERIC COATED TABLETS 81 MG 81 MG/1
81 TABLET, DELAYED RELEASE ORAL DAILY
Qty: 90 | Refills: 1 | Status: ACTIVE | COMMUNITY
Start: 2020-12-22

## 2024-06-17 RX ORDER — CLOPIDOGREL BISULFATE 75 MG/1
75 TABLET, FILM COATED ORAL DAILY
Qty: 90 | Refills: 3 | Status: ACTIVE | COMMUNITY

## 2024-06-17 NOTE — HISTORY OF PRESENT ILLNESS
[FreeTextEntry1] : 63-year-old male followed for history of hypertension, hyperlipidemia. C/o persistent and frequent palpitations, mostly at night when lying in bed. Followed by Dr. Baer for anxiety/stress; uses Xanax 5-7 times weekly. No prior history of any coronary artery disease. He exercises infrequently.  He denies any exertional chest pain shortness of breath or dizziness.  c/o vocal tics infrequently and some positional lightheadedness - told by PCP to stop HCTZ.  On 04/09/2024 presented with new onset exertional chest pain intermittent in nature no associated SOB, Lightheadedness, Dizziness.  Stress test on 4/10 was positive for ischemic ECG changes and evidence of RCA territory defect. Patient was admitted to [facility] on 05/20/2024 and discharged to home on 05/22/2024.   5/20 s/p LHC with PCI GUS x 1 RCA. Patient tolerated well, post PCI EKG without acute changes. Staged PCI on 5/21 - PCI GUS x 3 LAD.  Patient tolerated well, post PCI EKG without acute changes.  Doing well, no focal comaplints.

## 2024-06-17 NOTE — CARDIOLOGY SUMMARY
[de-identified] : 5/29/24, , VERONICAR' 04/09/2024:Sinus Bradycardia  -RSR(V1) -nondiagnostic.  PROBABLY NORMAL 3/20/24, Marked sinus Bradycardia (49 bpm), -RSR(V1) -nondiagnostic. 9/20/23, Sinus Bradycardia -First degree A-V block , -RSR(V1) -nondiagnostic. 3/20/23,Sinus  Bradycardia  -First degree A-V block , -RSR(V1) -nondiagnostic.   [de-identified] : 1/23/2021, sinus rhythm with no significant dysrhythmia.  (26-day monitor) [de-identified] : 7/14/22, EF 71%,  MR and TR\par  3/24/2020, ejection fraction 55 to 60%, impaired relaxation, interatrial septal aneurysm, trace MR TR.

## 2024-06-17 NOTE — DISCUSSION/SUMMARY
[FreeTextEntry1] : s/p Jeana x 2 , continue DAPT for 6 months. Discussed increased physical activity, lifestyle modifications. Would benefit from cardiac rehab, will discuss at next visit. LDL in 3/24 was 112, goal is LDL of 55, given evidence of ASHD. Will reassess in 2-3 months. BP adequate, h/o bradyarrhythmia, bbl's relatively contraindicated. [EKG obtained to assist in diagnosis and management of assessed problem(s)] : EKG obtained to assist in diagnosis and management of assessed problem(s)

## 2024-06-25 ENCOUNTER — APPOINTMENT (OUTPATIENT)
Dept: CARDIOLOGY | Facility: CLINIC | Age: 64
End: 2024-06-25
Payer: MEDICARE

## 2024-06-25 DIAGNOSIS — R42 DIZZINESS AND GIDDINESS: ICD-10-CM

## 2024-06-25 PROCEDURE — 93880 EXTRACRANIAL BILAT STUDY: CPT

## 2024-06-25 NOTE — H&P CARDIOLOGY - NS NEC GEN PE MLT EXAM PC
----- Message from LESLIE Madden sent at 6/20/2024  1:13 PM EDT -----  Labs are all stable except for low WBC. Continues to be low for the past few years. I recommend referral to  Hematology.   
No answer- couldn't leave VM.  
Sent message through [employer]   
No bruits; no thyromegaly or nodules

## 2024-07-25 ENCOUNTER — NON-APPOINTMENT (OUTPATIENT)
Age: 64
End: 2024-07-25

## 2024-07-26 ENCOUNTER — APPOINTMENT (OUTPATIENT)
Dept: CARDIOLOGY | Facility: CLINIC | Age: 64
End: 2024-07-26
Payer: MEDICARE

## 2024-07-26 ENCOUNTER — NON-APPOINTMENT (OUTPATIENT)
Age: 64
End: 2024-07-26

## 2024-07-26 VITALS
HEART RATE: 53 BPM | WEIGHT: 136 LBS | HEIGHT: 64 IN | OXYGEN SATURATION: 98 % | BODY MASS INDEX: 23.22 KG/M2 | SYSTOLIC BLOOD PRESSURE: 134 MMHG | DIASTOLIC BLOOD PRESSURE: 60 MMHG

## 2024-07-26 DIAGNOSIS — E78.5 HYPERLIPIDEMIA, UNSPECIFIED: ICD-10-CM

## 2024-07-26 DIAGNOSIS — R00.1 BRADYCARDIA, UNSPECIFIED: ICD-10-CM

## 2024-07-26 DIAGNOSIS — Z98.61 CORONARY ANGIOPLASTY STATUS: ICD-10-CM

## 2024-07-26 DIAGNOSIS — I25.10 ATHEROSCLEROTIC HEART DISEASE OF NATIVE CORONARY ARTERY W/OUT ANGINA PECTORIS: ICD-10-CM

## 2024-07-26 PROCEDURE — G2211 COMPLEX E/M VISIT ADD ON: CPT

## 2024-07-26 PROCEDURE — 99214 OFFICE O/P EST MOD 30 MIN: CPT

## 2024-07-26 PROCEDURE — 93000 ELECTROCARDIOGRAM COMPLETE: CPT

## 2024-07-26 NOTE — DISCUSSION/SUMMARY
[EKG obtained to assist in diagnosis and management of assessed problem(s)] : EKG obtained to assist in diagnosis and management of assessed problem(s) [FreeTextEntry1] : s/p Jeana x 2 , continue DAPT for 6 months. Discussed increased physical activity, lifestyle modifications. Would benefit from cardiac rehab, patient will schedule LDL in 3/24 was 112, goal is LDL of 55, given evidence of ASHD. Increased Lipitor to 80 mg daily; if L:DL still>70 would add Ezetimibe - repeat Lipids in 3 months at PCP. BP adequate, h/o bradyarrhythmia, now bradycardic but asymptomatic otherwise, continue monitoring.

## 2024-07-26 NOTE — CARDIOLOGY SUMMARY
[de-identified] : 7/26/24, Marked sinus Bradycardia (47 bpm) -First degree A-V block -RSR(V1) -nondiagnostic. 5/29/24, SR, RSR' 04/09/2024:Sinus Bradycardia  -RSR(V1) -nondiagnostic.  PROBABLY NORMAL 3/20/24, Marked sinus Bradycardia (49 bpm), -RSR(V1) -nondiagnostic. 9/20/23, Sinus Bradycardia -First degree A-V block , -RSR(V1) -nondiagnostic. 3/20/23,Sinus  Bradycardia  -First degree A-V block , -RSR(V1) -nondiagnostic.   [de-identified] : 1/23/2021, sinus rhythm with no significant dysrhythmia.  (26-day monitor) [de-identified] : 7/14/22, EF 71%,  MR and TR\par  3/24/2020, ejection fraction 55 to 60%, impaired relaxation, interatrial septal aneurysm, trace MR TR.

## 2024-07-26 NOTE — HISTORY OF PRESENT ILLNESS
[FreeTextEntry1] : 63-year-old male followed for history of hypertension, hyperlipidemia. C/o persistent and frequent palpitations, mostly at night when lying in bed. Followed by Dr. Baer for anxiety/stress; uses Xanax 5-7 times weekly. No prior history of any coronary artery disease. He exercises infrequently.  He denies any exertional chest pain shortness of breath or dizziness.  c/o vocal tics infrequently and some positional lightheadedness - told by PCP to stop HCTZ.  On 04/09/2024 presented with new onset exertional chest pain intermittent in nature no associated SOB, Lightheadedness, Dizziness.  Stress test on 4/10 was positive for ischemic ECG changes and evidence of RCA territory defect. Patient was admitted to [facility] on 05/20/2024 , s/p [facility] with PCI GUS x 1 RCA. Patient tolerated well, post PCI EKG without acute changes. Staged PCI on 5/21 - PCI GUS x 3 LAD.  Patient tolerated well, post PCI EKG without acute changes.  Labs from 6/21/24 reviewed, SEU=191. Hepatic function and TSH normal Doing well, no focal complaints.

## 2024-08-09 ENCOUNTER — APPOINTMENT (OUTPATIENT)
Dept: CARDIOLOGY | Facility: CLINIC | Age: 64
End: 2024-08-09

## 2024-08-09 ENCOUNTER — NON-APPOINTMENT (OUTPATIENT)
Age: 64
End: 2024-08-09

## 2024-08-09 PROCEDURE — G2211 COMPLEX E/M VISIT ADD ON: CPT

## 2024-08-09 PROCEDURE — 99214 OFFICE O/P EST MOD 30 MIN: CPT

## 2024-08-09 NOTE — PHYSICAL EXAM
[Well Developed] : well developed [Well Nourished] : well nourished [No Acute Distress] : no acute distress [Normal S1, S2] : normal S1, S2 [No Murmur] : no murmur [No Rub] : no rub [No Gallop] : no gallop [Clear Lung Fields] : clear lung fields [Good Air Entry] : good air entry [No Respiratory Distress] : no respiratory distress  [Normal Gait] : normal gait [No Varicosities] : no varicosities [Moves all extremities] : moves all extremities [No Focal Deficits] : no focal deficits [Normal Speech] : normal speech [Alert and Oriented] : alert and oriented [Normal memory] : normal memory

## 2024-08-11 NOTE — HISTORY OF PRESENT ILLNESS
[FreeTextEntry1] : 63-year-old male followed for history of hypertension, hyperlipidemia. C/o persistent and frequent palpitations, mostly at night when lying in bed. Followed by Dr. Baer for anxiety/stress; uses Xanax 5-7 times weekly. No prior history of any coronary artery disease. He exercises infrequently.  He denies any exertional chest pain shortness of breath or dizziness.  c/o vocal tics infrequently and some positional lightheadedness - told by PCP to stop HCTZ.  On 04/09/2024 presented with new onset exertional chest pain intermittent in nature no associated SOB, Lightheadedness, Dizziness.  Stress test on 4/10 was positive for ischemic ECG changes and evidence of RCA territory defect. [facility] , s/p [facility] with PCI GUS x 1 RCA. Patient tolerated well, post PCI EKG without acute changes. Staged PCI on 5/21 - PCI GUS x 3 LAD.  Patient tolerated well, post PCI EKG without acute changes.  Labs from 6/21/24 reviewed, [id number]. Hepatic function and TSH normal Doing well, no focal complaints.  08/09/2024 Comes in with complaint of CP the same he had prior to cardiac cath with GUS Schedule diagnostic [facility] for further evaluation

## 2024-08-11 NOTE — CARDIOLOGY SUMMARY
[de-identified] : 7/26/24, Marked sinus Bradycardia (47 bpm) -First degree A-V block -RSR(V1) -nondiagnostic. 5/29/24, SR, RSR' 04/09/2024:Sinus Bradycardia  -RSR(V1) -nondiagnostic.  PROBABLY NORMAL 3/20/24, Marked sinus Bradycardia (49 bpm), -RSR(V1) -nondiagnostic. 9/20/23, Sinus Bradycardia -First degree A-V block , -RSR(V1) -nondiagnostic. 3/20/23,Sinus  Bradycardia  -First degree A-V block , -RSR(V1) -nondiagnostic.   [de-identified] : 1/23/2021, sinus rhythm with no significant dysrhythmia.  (26-day monitor) [de-identified] : 7/14/22, EF 71%,  MR and TR\par  3/24/2020, ejection fraction 55 to 60%, impaired relaxation, interatrial septal aneurysm, trace MR TR.

## 2024-08-11 NOTE — CARDIOLOGY SUMMARY
[de-identified] : 7/26/24, Marked sinus Bradycardia (47 bpm) -First degree A-V block -RSR(V1) -nondiagnostic. 5/29/24, SR, RSR' 04/09/2024:Sinus Bradycardia  -RSR(V1) -nondiagnostic.  PROBABLY NORMAL 3/20/24, Marked sinus Bradycardia (49 bpm), -RSR(V1) -nondiagnostic. 9/20/23, Sinus Bradycardia -First degree A-V block , -RSR(V1) -nondiagnostic. 3/20/23,Sinus  Bradycardia  -First degree A-V block , -RSR(V1) -nondiagnostic.   [de-identified] : 1/23/2021, sinus rhythm with no significant dysrhythmia.  (26-day monitor) [de-identified] : 7/14/22, EF 71%,  MR and TR\par  3/24/2020, ejection fraction 55 to 60%, impaired relaxation, interatrial septal aneurysm, trace MR TR.

## 2024-08-11 NOTE — DISCUSSION/SUMMARY
[FreeTextEntry1] : s/p Jeana x 2 , continue DAPT for 6 months. Discussed increased physical activity, lifestyle modifications. Would benefit from cardiac rehab, patient will schedule LDL in 3/24 was 112, goal is LDL of 55, given evidence of ASHD. Increased Lipitor to 80 mg daily; if L:DL still>70 would add Ezetimibe - repeat Lipids in 3 months at PCP. BP adequate, h/o bradyarrhythmia, now bradycardic but asymptomatic otherwise, continue monitoring.

## 2024-08-11 NOTE — REVIEW OF SYSTEMS
[Chest Discomfort] : chest discomfort [Negative] : Heme/Lymph [Dyspnea on exertion] : not dyspnea during exertion [Palpitations] : no palpitations

## 2024-08-11 NOTE — HISTORY OF PRESENT ILLNESS
[FreeTextEntry1] : 63-year-old male followed for history of hypertension, hyperlipidemia. C/o persistent and frequent palpitations, mostly at night when lying in bed. Followed by Dr. Baer for anxiety/stress; uses Xanax 5-7 times weekly. No prior history of any coronary artery disease. He exercises infrequently.  He denies any exertional chest pain shortness of breath or dizziness.  c/o vocal tics infrequently and some positional lightheadedness - told by PCP to stop HCTZ.  On 04/09/2024 presented with new onset exertional chest pain intermittent in nature no associated SOB, Lightheadedness, Dizziness.  Stress test on 4/10 was positive for ischemic ECG changes and evidence of RCA territory defect. Patient was admitted to [facility] on 05/20/2024 , s/p [facility] with PCI GUS x 1 RCA. Patient tolerated well, post PCI EKG without acute changes. Staged PCI on 5/21 - PCI GUS x 3 LAD.  Patient tolerated well, post PCI EKG without acute changes.  Labs from 6/21/24 reviewed, IPN=[id number]. Hepatic function and TSH normal Doing well, no focal complaints.  08/09/2024 Comes in with complaint of CP the same he had prior to cardiac cath with GUS Schedule diagnostic [facility] for further evaluation

## 2024-08-14 ENCOUNTER — TRANSCRIPTION ENCOUNTER (OUTPATIENT)
Age: 64
End: 2024-08-14

## 2024-08-15 PROBLEM — I25.10 ATHEROSCLEROTIC HEART DISEASE OF NATIVE CORONARY ARTERY WITHOUT ANGINA PECTORIS: Chronic | Status: ACTIVE | Noted: 2024-08-14

## 2024-08-15 PROBLEM — F41.9 ANXIETY DISORDER, UNSPECIFIED: Chronic | Status: ACTIVE | Noted: 2024-08-14

## 2024-08-19 ENCOUNTER — NON-APPOINTMENT (OUTPATIENT)
Age: 64
End: 2024-08-19

## 2024-08-20 ENCOUNTER — APPOINTMENT (OUTPATIENT)
Dept: CARDIOLOGY | Facility: CLINIC | Age: 64
End: 2024-08-20
Payer: MEDICARE

## 2024-08-20 ENCOUNTER — NON-APPOINTMENT (OUTPATIENT)
Age: 64
End: 2024-08-20

## 2024-08-20 VITALS
DIASTOLIC BLOOD PRESSURE: 80 MMHG | WEIGHT: 135.2 LBS | BODY MASS INDEX: 23.08 KG/M2 | OXYGEN SATURATION: 98 % | HEART RATE: 53 BPM | SYSTOLIC BLOOD PRESSURE: 128 MMHG | HEIGHT: 64 IN

## 2024-08-20 DIAGNOSIS — R07.89 OTHER CHEST PAIN: ICD-10-CM

## 2024-08-20 DIAGNOSIS — E78.5 HYPERLIPIDEMIA, UNSPECIFIED: ICD-10-CM

## 2024-08-20 DIAGNOSIS — I25.10 ATHEROSCLEROTIC HEART DISEASE OF NATIVE CORONARY ARTERY W/OUT ANGINA PECTORIS: ICD-10-CM

## 2024-08-20 DIAGNOSIS — I10 ESSENTIAL (PRIMARY) HYPERTENSION: ICD-10-CM

## 2024-08-20 PROCEDURE — 99214 OFFICE O/P EST MOD 30 MIN: CPT

## 2024-08-20 PROCEDURE — 93000 ELECTROCARDIOGRAM COMPLETE: CPT

## 2024-08-20 PROCEDURE — G2211 COMPLEX E/M VISIT ADD ON: CPT

## 2024-08-23 NOTE — ADDENDUM
[FreeTextEntry1] : Chart reviewed. If anginal pain persists may represent small vessel disease, would give empiric trial of either nitrates or Ranolazine. May also benefit from more anti-anxiolytics. Will reach out to patient next week and assess symptom and consider intervention.

## 2024-08-23 NOTE — HISTORY OF PRESENT ILLNESS
[FreeTextEntry1] : 63-year-old male followed for history of hypertension, hyperlipidemia. C/o persistent and frequent palpitations, mostly at night when lying in bed. Followed by Dr. Baer for anxiety/stress; uses Xanax 5-7 times weekly. No prior history of any coronary artery disease. He exercises infrequently.  He denies any exertional chest pain shortness of breath or dizziness.  c/o vocal tics infrequently and some positional lightheadedness - told by PCP to stop HCTZ.  On 04/09/2024 presented with new onset exertional chest pain intermittent in nature no associated SOB, Lightheadedness, Dizziness.  Stress test on 4/10 was positive for ischemic ECG changes and evidence of RCA territory defect. Patient was admitted to [facility] on 05/20/2024 , s/p C with PCI GUS x 1 RCA. Patient tolerated well, post PCI EKG without acute changes. Staged PCI on 5/21 - PCI GUS x 3 LAD.  Patient tolerated well, post PCI EKG without acute changes.  Labs from 6/21/24 reviewed, KMC=008. Hepatic function and TSH normal Doing well, no focal complaints.  08/09/2024 Comes in with complaint of CP the same he had prior to cardiac cath with GUS Schedule diagnostic [facility] for further evaluation  08/20/2024 Here for cardiac clearance for cardiac rehab  [phone] (fax)  Pt is still complaining of chest pain, exertional and non exertional 2/10  Recent cath showed no lesions, no stent placements No SOB, dizziness, palpitations and syncope

## 2024-08-23 NOTE — CARDIOLOGY SUMMARY
[de-identified] : 7/26/24, Marked sinus Bradycardia (47 bpm) -First degree A-V block -RSR(V1) -nondiagnostic. 5/29/24, SR, RSR' 04/09/2024:Sinus Bradycardia  -RSR(V1) -nondiagnostic.  PROBABLY NORMAL 3/20/24, Marked sinus Bradycardia (49 bpm), -RSR(V1) -nondiagnostic. 9/20/23, Sinus Bradycardia -First degree A-V block , -RSR(V1) -nondiagnostic. 3/20/23,Sinus  Bradycardia  -First degree A-V block , -RSR(V1) -nondiagnostic.   [de-identified] : 1/23/2021, sinus rhythm with no significant dysrhythmia.  (26-day monitor) [de-identified] : 7/14/22, EF 71%,  MR and TR\par  3/24/2020, ejection fraction 55 to 60%, impaired relaxation, interatrial septal aneurysm, trace MR TR.

## 2024-08-23 NOTE — DISCUSSION/SUMMARY
[EKG obtained to assist in diagnosis and management of assessed problem(s)] : EKG obtained to assist in diagnosis and management of assessed problem(s) [FreeTextEntry1] : s/p Jenaa x 2 , continue DAPT for 6 months. Discussed increased physical activity, lifestyle modifications. Would benefit from cardiac rehab, patient will schedule

## 2024-08-23 NOTE — DISCUSSION/SUMMARY
[EKG obtained to assist in diagnosis and management of assessed problem(s)] : EKG obtained to assist in diagnosis and management of assessed problem(s) [FreeTextEntry1] : s/p Jeana x 2 , continue DAPT for 6 months. Discussed increased physical activity, lifestyle modifications. Would benefit from cardiac rehab, patient will schedule

## 2024-08-23 NOTE — CARDIOLOGY SUMMARY
[de-identified] : 7/26/24, Marked sinus Bradycardia (47 bpm) -First degree A-V block -RSR(V1) -nondiagnostic. 5/29/24, SR, RSR' 04/09/2024:Sinus Bradycardia  -RSR(V1) -nondiagnostic.  PROBABLY NORMAL 3/20/24, Marked sinus Bradycardia (49 bpm), -RSR(V1) -nondiagnostic. 9/20/23, Sinus Bradycardia -First degree A-V block , -RSR(V1) -nondiagnostic. 3/20/23,Sinus  Bradycardia  -First degree A-V block , -RSR(V1) -nondiagnostic.   [de-identified] : 1/23/2021, sinus rhythm with no significant dysrhythmia.  (26-day monitor) [de-identified] : 7/14/22, EF 71%,  MR and TR\par  3/24/2020, ejection fraction 55 to 60%, impaired relaxation, interatrial septal aneurysm, trace MR TR.

## 2024-08-23 NOTE — CARDIOLOGY SUMMARY
[de-identified] : 7/26/24, Marked sinus Bradycardia (47 bpm) -First degree A-V block -RSR(V1) -nondiagnostic. 5/29/24, SR, RSR' 04/09/2024:Sinus Bradycardia  -RSR(V1) -nondiagnostic.  PROBABLY NORMAL 3/20/24, Marked sinus Bradycardia (49 bpm), -RSR(V1) -nondiagnostic. 9/20/23, Sinus Bradycardia -First degree A-V block , -RSR(V1) -nondiagnostic. 3/20/23,Sinus  Bradycardia  -First degree A-V block , -RSR(V1) -nondiagnostic.   [de-identified] : 1/23/2021, sinus rhythm with no significant dysrhythmia.  (26-day monitor) [de-identified] : 7/14/22, EF 71%,  MR and TR\par  3/24/2020, ejection fraction 55 to 60%, impaired relaxation, interatrial septal aneurysm, trace MR TR.

## 2024-08-23 NOTE — HISTORY OF PRESENT ILLNESS
[FreeTextEntry1] : 63-year-old male followed for history of hypertension, hyperlipidemia. C/o persistent and frequent palpitations, mostly at night when lying in bed. Followed by Dr. Baer for anxiety/stress; uses Xanax 5-7 times weekly. No prior history of any coronary artery disease. He exercises infrequently.  He denies any exertional chest pain shortness of breath or dizziness.  c/o vocal tics infrequently and some positional lightheadedness - told by PCP to stop HCTZ.  On 04/09/2024 presented with new onset exertional chest pain intermittent in nature no associated SOB, Lightheadedness, Dizziness.  Stress test on 4/10 was positive for ischemic ECG changes and evidence of RCA territory defect. Patient was admitted to [facility] on 05/20/2024 , s/p C with PCI GUS x 1 RCA. Patient tolerated well, post PCI EKG without acute changes. Staged PCI on 5/21 - PCI GUS x 3 LAD.  Patient tolerated well, post PCI EKG without acute changes.  Labs from 6/21/24 reviewed, OVJ=432. Hepatic function and TSH normal Doing well, no focal complaints.  08/09/2024 Comes in with complaint of CP the same he had prior to cardiac cath with GUS Schedule diagnostic [facility] for further evaluation  08/20/2024 Here for cardiac clearance for cardiac rehab  [phone] (fax)  Pt is still complaining of chest pain, exertional and non exertional 2/10  Recent cath showed no lesions, no stent placements No SOB, dizziness, palpitations and syncope

## 2024-08-23 NOTE — HISTORY OF PRESENT ILLNESS
[FreeTextEntry1] : 63-year-old male followed for history of hypertension, hyperlipidemia. C/o persistent and frequent palpitations, mostly at night when lying in bed. Followed by Dr. Baer for anxiety/stress; uses Xanax 5-7 times weekly. No prior history of any coronary artery disease. He exercises infrequently.  He denies any exertional chest pain shortness of breath or dizziness.  c/o vocal tics infrequently and some positional lightheadedness - told by PCP to stop HCTZ.  On 04/09/2024 presented with new onset exertional chest pain intermittent in nature no associated SOB, Lightheadedness, Dizziness.  Stress test on 4/10 was positive for ischemic ECG changes and evidence of RCA territory defect. Patient was admitted to [facility] on 05/20/2024 , s/p C with PCI GUS x 1 RCA. Patient tolerated well, post PCI EKG without acute changes. Staged PCI on 5/21 - PCI GUS x 3 LAD.  Patient tolerated well, post PCI EKG without acute changes.  Labs from 6/21/24 reviewed, EHX=235. Hepatic function and TSH normal Doing well, no focal complaints.  08/09/2024 Comes in with complaint of CP the same he had prior to cardiac cath with GUS Schedule diagnostic [facility] for further evaluation  08/20/2024 Here for cardiac clearance for cardiac rehab  [phone] (fax)  Pt is still complaining of chest pain, exertional and non exertional 2/10  Recent cath showed no lesions, no stent placements No SOB, dizziness, palpitations and syncope

## 2024-08-30 RX ORDER — ISOSORBIDE MONONITRATE 10 MG/1
10 TABLET ORAL TWICE DAILY
Qty: 180 | Refills: 0 | Status: ACTIVE | COMMUNITY
Start: 2024-08-30 | End: 1900-01-01

## 2024-09-23 ENCOUNTER — APPOINTMENT (OUTPATIENT)
Dept: CARDIOLOGY | Facility: CLINIC | Age: 64
End: 2024-09-23

## 2024-12-03 ENCOUNTER — RX RENEWAL (OUTPATIENT)
Age: 64
End: 2024-12-03

## 2025-01-27 ENCOUNTER — NON-APPOINTMENT (OUTPATIENT)
Age: 65
End: 2025-01-27

## 2025-01-27 ENCOUNTER — APPOINTMENT (OUTPATIENT)
Dept: CARDIOLOGY | Facility: CLINIC | Age: 65
End: 2025-01-27
Payer: MEDICARE

## 2025-01-27 VITALS
DIASTOLIC BLOOD PRESSURE: 70 MMHG | WEIGHT: 135 LBS | HEIGHT: 64 IN | HEART RATE: 51 BPM | OXYGEN SATURATION: 97 % | BODY MASS INDEX: 23.05 KG/M2 | SYSTOLIC BLOOD PRESSURE: 120 MMHG

## 2025-01-27 PROCEDURE — G2211 COMPLEX E/M VISIT ADD ON: CPT

## 2025-01-27 PROCEDURE — 99214 OFFICE O/P EST MOD 30 MIN: CPT

## 2025-01-27 PROCEDURE — 93000 ELECTROCARDIOGRAM COMPLETE: CPT

## 2025-01-29 DIAGNOSIS — E78.5 HYPERLIPIDEMIA, UNSPECIFIED: ICD-10-CM

## 2025-01-29 RX ORDER — ROSUVASTATIN CALCIUM 20 MG/1
20 TABLET, FILM COATED ORAL
Qty: 90 | Refills: 2 | Status: ACTIVE | COMMUNITY
Start: 2025-01-29 | End: 1900-01-01

## 2025-01-29 RX ORDER — EZETIMIBE 10 MG/1
10 TABLET ORAL
Qty: 90 | Refills: 2 | Status: ACTIVE | COMMUNITY
Start: 2025-01-29 | End: 1900-01-01

## 2025-03-06 ENCOUNTER — RX RENEWAL (OUTPATIENT)
Age: 65
End: 2025-03-06

## 2025-04-04 ENCOUNTER — NON-APPOINTMENT (OUTPATIENT)
Age: 65
End: 2025-04-04

## 2025-04-04 ENCOUNTER — APPOINTMENT (OUTPATIENT)
Dept: CARDIOLOGY | Facility: CLINIC | Age: 65
End: 2025-04-04

## 2025-04-04 VITALS
HEIGHT: 64 IN | DIASTOLIC BLOOD PRESSURE: 78 MMHG | BODY MASS INDEX: 22.2 KG/M2 | WEIGHT: 130 LBS | OXYGEN SATURATION: 98 % | HEART RATE: 57 BPM | SYSTOLIC BLOOD PRESSURE: 120 MMHG

## 2025-04-04 DIAGNOSIS — E78.5 HYPERLIPIDEMIA, UNSPECIFIED: ICD-10-CM

## 2025-04-04 DIAGNOSIS — R00.2 PALPITATIONS: ICD-10-CM

## 2025-04-04 DIAGNOSIS — I10 ESSENTIAL (PRIMARY) HYPERTENSION: ICD-10-CM

## 2025-04-04 PROCEDURE — 93000 ELECTROCARDIOGRAM COMPLETE: CPT

## 2025-04-04 PROCEDURE — 99214 OFFICE O/P EST MOD 30 MIN: CPT

## 2025-04-04 PROCEDURE — G2211 COMPLEX E/M VISIT ADD ON: CPT

## 2025-04-25 ENCOUNTER — TRANSCRIPTION ENCOUNTER (OUTPATIENT)
Age: 65
End: 2025-04-25

## 2025-05-12 ENCOUNTER — TRANSCRIPTION ENCOUNTER (OUTPATIENT)
Age: 65
End: 2025-05-12

## 2025-05-22 ENCOUNTER — APPOINTMENT (OUTPATIENT)
Dept: CARDIOLOGY | Facility: CLINIC | Age: 65
End: 2025-05-22
Payer: MEDICARE

## 2025-05-22 VITALS
BODY MASS INDEX: 23.39 KG/M2 | OXYGEN SATURATION: 98 % | HEART RATE: 46 BPM | SYSTOLIC BLOOD PRESSURE: 130 MMHG | DIASTOLIC BLOOD PRESSURE: 80 MMHG | WEIGHT: 137 LBS | HEIGHT: 64 IN

## 2025-05-22 DIAGNOSIS — R00.2 PALPITATIONS: ICD-10-CM

## 2025-05-22 DIAGNOSIS — R07.89 OTHER CHEST PAIN: ICD-10-CM

## 2025-05-22 DIAGNOSIS — E78.5 HYPERLIPIDEMIA, UNSPECIFIED: ICD-10-CM

## 2025-05-22 DIAGNOSIS — M65.30 TRIGGER FINGER, UNSPECIFIED FINGER: ICD-10-CM

## 2025-05-22 DIAGNOSIS — I25.10 ATHEROSCLEROTIC HEART DISEASE OF NATIVE CORONARY ARTERY W/OUT ANGINA PECTORIS: ICD-10-CM

## 2025-05-22 PROCEDURE — 99214 OFFICE O/P EST MOD 30 MIN: CPT

## 2025-05-22 PROCEDURE — 93000 ELECTROCARDIOGRAM COMPLETE: CPT

## 2025-05-22 PROCEDURE — G2211 COMPLEX E/M VISIT ADD ON: CPT

## 2025-07-11 ENCOUNTER — APPOINTMENT (OUTPATIENT)
Dept: CARDIOLOGY | Facility: CLINIC | Age: 65
End: 2025-07-11
Payer: MEDICARE

## 2025-07-11 VITALS
SYSTOLIC BLOOD PRESSURE: 128 MMHG | HEIGHT: 64 IN | DIASTOLIC BLOOD PRESSURE: 70 MMHG | HEART RATE: 67 BPM | WEIGHT: 136 LBS | OXYGEN SATURATION: 98 % | BODY MASS INDEX: 23.22 KG/M2

## 2025-07-11 PROCEDURE — 36415 COLL VENOUS BLD VENIPUNCTURE: CPT

## 2025-07-11 PROCEDURE — 99213 OFFICE O/P EST LOW 20 MIN: CPT

## 2025-07-11 PROCEDURE — G2211 COMPLEX E/M VISIT ADD ON: CPT

## 2025-07-14 ENCOUNTER — NON-APPOINTMENT (OUTPATIENT)
Age: 65
End: 2025-07-14

## 2025-07-15 LAB
CHOLEST SERPL-MCNC: 139 MG/DL
CK SERPL-CCNC: 123 U/L
HDLC SERPL-MCNC: 46 MG/DL
LDLC SERPL-MCNC: 76 MG/DL
NONHDLC SERPL-MCNC: 94 MG/DL
TRIGL SERPL-MCNC: 91 MG/DL